# Patient Record
Sex: MALE | Race: WHITE | NOT HISPANIC OR LATINO | Employment: OTHER | URBAN - METROPOLITAN AREA
[De-identification: names, ages, dates, MRNs, and addresses within clinical notes are randomized per-mention and may not be internally consistent; named-entity substitution may affect disease eponyms.]

---

## 2018-11-20 ENCOUNTER — HISTORICAL (OUTPATIENT)
Dept: RADIOLOGY | Facility: HOSPITAL | Age: 78
End: 2018-11-20

## 2018-11-20 LAB — POC CREATININE: 1.1 MG/DL (ref 0.6–1.3)

## 2020-08-25 ENCOUNTER — HISTORICAL (OUTPATIENT)
Dept: RADIOLOGY | Facility: HOSPITAL | Age: 80
End: 2020-08-25

## 2020-09-02 ENCOUNTER — HISTORICAL (OUTPATIENT)
Dept: RADIOLOGY | Facility: HOSPITAL | Age: 80
End: 2020-09-02

## 2020-09-02 LAB
ABS NEUT (OLG): 3.1 X10(3)/MCL (ref 1.5–6.9)
APTT PPP: 30.1 SEC (ref 23.4–34.9)
BUN SERPL-MCNC: 22 MG/DL (ref 8.4–25.7)
CALCIUM SERPL-MCNC: 9.9 MG/DL (ref 8.8–10)
CHLORIDE SERPL-SCNC: 103 MMOL/L (ref 98–107)
CO2 SERPL-SCNC: 26 MMOL/L (ref 23–31)
CREAT SERPL-MCNC: 1.28 MG/DL (ref 0.73–1.18)
CREAT/UREA NIT SERPL: 17
ERYTHROCYTE [DISTWIDTH] IN BLOOD BY AUTOMATED COUNT: 13.8 % (ref 11.5–17)
GLUCOSE SERPL-MCNC: 99 MG/DL (ref 82–115)
HCT VFR BLD AUTO: 38.6 % (ref 42–52)
HGB BLD-MCNC: 13.1 GM/DL (ref 14–18)
INR PPP: 1.1 (ref 2–3)
MCH RBC QN AUTO: 32 PG (ref 27–34)
MCHC RBC AUTO-ENTMCNC: 34 GM/DL (ref 31–36)
MCV RBC AUTO: 93 FL (ref 80–99)
PLATELET # BLD AUTO: 134 X10(3)/MCL (ref 140–400)
PMV BLD AUTO: 10.7 FL (ref 6.8–10)
POTASSIUM SERPL-SCNC: 5 MMOL/L (ref 3.5–5.1)
PROTHROMBIN TIME: 13.6 SEC (ref 11.7–14.5)
PSA SERPL-MCNC: 105.66 NG/ML
RBC # BLD AUTO: 4.15 X10(6)/MCL (ref 4.7–6.1)
SODIUM SERPL-SCNC: 139 MMOL/L (ref 136–145)
WBC # SPEC AUTO: 5.5 X10(3)/MCL (ref 4.5–11.5)

## 2020-09-08 ENCOUNTER — HISTORICAL (OUTPATIENT)
Dept: ANESTHESIOLOGY | Facility: HOSPITAL | Age: 80
End: 2020-09-08

## 2020-09-25 ENCOUNTER — HISTORICAL (OUTPATIENT)
Dept: RADIOLOGY | Facility: HOSPITAL | Age: 80
End: 2020-09-25

## 2020-10-02 ENCOUNTER — HISTORICAL (OUTPATIENT)
Dept: RADIOLOGY | Facility: HOSPITAL | Age: 80
End: 2020-10-02

## 2020-10-29 LAB
ABS NEUT (OLG): 3.4 X10(3)/MCL (ref 1.5–6.9)
APTT PPP: 29.3 SEC (ref 23.4–34.9)
BUN SERPL-MCNC: 21 MG/DL (ref 8.4–25.7)
CALCIUM SERPL-MCNC: 9.5 MG/DL (ref 8.8–10)
CHLORIDE SERPL-SCNC: 103 MMOL/L (ref 98–107)
CO2 SERPL-SCNC: 27 MMOL/L (ref 23–31)
CREAT SERPL-MCNC: 1.28 MG/DL (ref 0.73–1.18)
CREAT/UREA NIT SERPL: 16
ERYTHROCYTE [DISTWIDTH] IN BLOOD BY AUTOMATED COUNT: 13.2 % (ref 11.5–17)
GLUCOSE SERPL-MCNC: 121 MG/DL (ref 82–115)
HCT VFR BLD AUTO: 36.7 % (ref 42–52)
HGB BLD-MCNC: 12.5 GM/DL (ref 14–18)
INR PPP: 1.1 (ref 2–3)
MCH RBC QN AUTO: 32 PG (ref 27–34)
MCHC RBC AUTO-ENTMCNC: 34 GM/DL (ref 31–36)
MCV RBC AUTO: 93 FL (ref 80–99)
PLATELET # BLD AUTO: 134 X10(3)/MCL (ref 140–400)
PMV BLD AUTO: 10.5 FL (ref 6.8–10)
POTASSIUM SERPL-SCNC: 4.1 MMOL/L (ref 3.5–5.1)
PROTHROMBIN TIME: 13.6 SEC (ref 11.7–14.5)
RBC # BLD AUTO: 3.95 X10(6)/MCL (ref 4.7–6.1)
SODIUM SERPL-SCNC: 138 MMOL/L (ref 136–145)
WBC # SPEC AUTO: 5.2 X10(3)/MCL (ref 4.5–11.5)

## 2020-11-03 ENCOUNTER — HOSPITAL ENCOUNTER (OUTPATIENT)
Dept: MEDSURG UNIT | Facility: HOSPITAL | Age: 80
End: 2020-11-05
Attending: UROLOGY | Admitting: UROLOGY

## 2020-11-03 LAB
BUN SERPL-MCNC: 23 MG/DL (ref 8.4–25.7)
CALCIUM SERPL-MCNC: 8.7 MG/DL (ref 8.8–10)
CHLORIDE SERPL-SCNC: 103 MMOL/L (ref 98–107)
CO2 SERPL-SCNC: 27 MMOL/L (ref 23–31)
CREAT SERPL-MCNC: 1.17 MG/DL (ref 0.73–1.18)
CREAT/UREA NIT SERPL: 20
EST CREAT CLEARANCE SER (OHS): 48.89 ML/MIN
GLUCOSE SERPL-MCNC: 105 MG/DL (ref 82–115)
HCT VFR BLD AUTO: 32.7 % (ref 42–52)
HGB BLD-MCNC: 10.9 GM/DL (ref 14–18)
POTASSIUM SERPL-SCNC: 5.3 MMOL/L (ref 3.5–5.1)
SODIUM SERPL-SCNC: 137 MMOL/L (ref 136–145)

## 2021-02-17 ENCOUNTER — HISTORICAL (OUTPATIENT)
Dept: RADIOLOGY | Facility: HOSPITAL | Age: 81
End: 2021-02-17

## 2021-02-17 LAB
BUN SERPL-MCNC: 29 MG/DL (ref 8.4–25.7)
CALCIUM SERPL-MCNC: 9.7 MG/DL (ref 8.8–10)
CHLORIDE SERPL-SCNC: 99 MMOL/L (ref 98–107)
CO2 SERPL-SCNC: 29 MMOL/L (ref 23–31)
CREAT SERPL-MCNC: 1.19 MG/DL (ref 0.73–1.18)
CREAT/UREA NIT SERPL: 24
GLUCOSE SERPL-MCNC: 87 MG/DL (ref 82–115)
POTASSIUM SERPL-SCNC: 4.8 MMOL/L (ref 3.5–5.1)
PSA SERPL-MCNC: <0.1 NG/ML
SODIUM SERPL-SCNC: 137 MMOL/L (ref 136–145)

## 2021-03-02 ENCOUNTER — HISTORICAL (OUTPATIENT)
Dept: WOUND CARE | Facility: HOSPITAL | Age: 81
End: 2021-03-02

## 2021-12-09 ENCOUNTER — HISTORICAL (OUTPATIENT)
Dept: RADIOLOGY | Facility: HOSPITAL | Age: 81
End: 2021-12-09

## 2022-01-24 ENCOUNTER — HISTORICAL (OUTPATIENT)
Dept: RADIOLOGY | Facility: HOSPITAL | Age: 82
End: 2022-01-24

## 2022-04-21 ENCOUNTER — HISTORICAL (OUTPATIENT)
Dept: ADMINISTRATIVE | Facility: HOSPITAL | Age: 82
End: 2022-04-21
Payer: MEDICARE

## 2022-04-30 NOTE — OP NOTE
PREOPERATIVE DIAGNOSES:    1. Prostate nodule.  2. Elevated prostate-specific antigen.  3. Right hydronephrosis.  4. Incomplete emptying of the bladder.    POSTOPERATIVE DIAGNOSES:    1. Prostate nodule.  2. Elevated prostate-specific antigen.  3. Right hydronephrosis.  4. Incomplete emptying of the bladder.   5. Urethral stricture.    ANESTHESIA:  IV sedation and local.    PROCEDURE:  Cystoscopy with dilation of bulbar urethral stricture, right retrograde pyelogram, transrectal biopsy of the prostate.    DESCRIPTION OF PROCEDURE:  After operative consent, patient brought to the operating room and placed on the table in supine position.  IV sedation induced.  Patient converted to dorsal lithotomy position.  Genital area prepped and draped in the usual sterile fashion.  After adequate local anesthesia with 2% plain lidocaine jelly intraurethrally, the 22-Zambian cystoscope was advanced per urethra in the bladder __________ lesions.  He had a bulbar urethral stricture that was dilated with the scope.  The prostate was 3 fingerbreadths with trilobar obstruction.  Bladder demonstrated diffuse 3+ trabeculation.  Visualization was difficult secondary to chronic oozing of blood.  Never identified the left ureteral orifice.  Right ureteral orifice was able to be identified with difficulty.  We performed a right retrograde pyelogram.  We performed __________ from the lower portion of the bony pelvis all the way up.  He had severe J hooking of his distal ureter.  I was never able to get any guidewire, Glidewire or anything else past this point.  We tried the ureteroscope.  We were not able to visualize secondary to the bleeding.  At this time, the procedure was terminated.  The bladder was drained.  Transrectal biopsy of the prostate was obtained.  We obtained 2 cores from the right lobe and 3 cores from the left lobe.  The left lobe was where the firm area was.  The patient returned to his room.  He will be placed  on Cipro 500 mg 1 p.o. b.i.d. for 3 days.  We will check bladder scan to see if he is able to urinate.  I will follow him up in the office in 1 week with the results of his biopsy.     His preoperative lab work revealed electrolytes to be within normal limits.  His creatinine was 1.28.  PSA was elevated at 105.66.  PT was 13.6 with an INR of 1.1.  His hematocrit was 38.6.  He had discontinued his Plavix since 09/01.  His ultrasound of the abdomen revealed a right hydronephrosis and what appeared to be a small cyst in the upper pole of the left kidney.    PLAN:  Follow up in the office in 1 week with the results of his biopsy and determination of further treatment options.        REED/JERE   DD: 09/08/2020 0908   DT: 09/08/2020 0940  Job # [id number]    cc: Dr. Ye

## 2022-04-30 NOTE — DISCHARGE SUMMARY
PRIMARY DIAGNOSIS:  Prostate cancer, incomplete emptying of bladder.    OPERATIVE PROCEDURE:  Transurethral resection of prostate on 11/03.    HISTORY AND PHYSICAL:  See chart.    LABORATORY DATA:  Postop laboratory data revealed electrolytes within normal limits.  His potassium was slightly elevated at 5.3.  His postop hematocrit was 32.7.  Pathology report returned Jyoti 9 prostate cancer.    HOSPITAL COURSE:  The patient was admitted to the hospital on 11/03.  He underwent an uneventful transurethral resection of his prostate.  He has done well postoperatively.  His Marte catheter was removed on his 1st postoperative day.  He was passing initially some clots and had red urine.  His urine is now clear.  He is voiding with a good stream.  His bladder scan is 0.  His pathology report returned Jyoti 9 prostate cancer, which was confirmatory with his preoperative biopsy.  He has done well.  He is feeling well.  He is ambulating.  He is tolerating his diet, and he is ready for discharge.  He is being discharged to home on Cipro 500 mg 1 p.o. b.i.d. for 5 days.  He is instructed no driving, lifting, or straining.  He is to follow up with me in the office in 2 weeks.        REED/JERE   DD: 11/05/2020 1001   DT: 11/05/2020 1118  Job # [id number]    cc: Dr. Ye

## 2022-04-30 NOTE — OP NOTE
PREOPERATIVE DIAGNOSIS:  Prostate cancer, bladder outlet obstruction, elevated residual urine.    POSTOPERATIVE DIAGNOSIS:  Prostate cancer, bladder outlet obstruction, elevated residual urine.    ANESTHESIA:  Spinal.    PROCEDURE:  Transurethral resection of the prostate.    DESCRIPTION OF PROCEDURE:  After operative consent, patient was brought to the operating room, placed on the table in supine position.  Spinal anesthesia induced.  Patient converted to dorsal lithotomy position.  Genital area prepped and draped in usual sterile fashion.  The 26-Syrian continuous flow resectoscope sheath was inserted per urethra into the bladder with the aid of the visual obturator.       The bladder was visualized.  There were no mucosal lesions identified.  Ureteral orifices could not be identified, secondary to the median lobe.  At this time, the resectoscope was pulled into the proximal portion of the prostate, and the median lobe was resected.  Hemostasis was obtained by means of electrocoagulation.  At this time, the ureteral orifices could be seen.  Left ureteral orifice normal.  Right ureteral orifice somewhat heaped up and more difficult to visualize.  The resectoscope was then pulled into the distal portion of the verumontanum, and the prostate was resected circumferentially.  No major venous sinuses or capsular perforations encountered.  The verumontanum was left intact.  After completion of resection, the bladder was evacuated of residual prostatic chips using Ellik evacuator.  The bladder was then visualized.  There was no injury to the bladder.  Ureteral orifices were identified and were intact.       Final hemostasis was obtained by means of electrocoagulation, and resectoscope was removed.  A 22-Syrian three-way, 30 cc Marte catheter was inserted per urethra into the bladder.  It was     irrigated until clear, placed on light traction, continuous bladder irrigation with sterile water, and the patient  was transported to the recovery room in stable condition.        REED/JERE   DD: 11/03/2020 0857   DT: 11/03/2020 1017  Job # [id number]    cc: Dr Ye

## 2022-05-09 ENCOUNTER — HOSPITAL ENCOUNTER (OUTPATIENT)
Dept: RADIOLOGY | Facility: HOSPITAL | Age: 82
Discharge: HOME | End: 2022-05-09
Attending: UROLOGY
Payer: MEDICARE

## 2022-05-09 DIAGNOSIS — S32.020A CLOSED COMPRESSION FRACTURE OF L2 LUMBAR VERTEBRA, INITIAL ENCOUNTER: ICD-10-CM

## 2022-05-09 PROCEDURE — 71045 X-RAY EXAM CHEST 1 VIEW: CPT | Mod: TC

## 2022-07-05 ENCOUNTER — HOSPITAL ENCOUNTER (OUTPATIENT)
Dept: RADIOLOGY | Facility: HOSPITAL | Age: 82
Discharge: HOME | End: 2022-07-05
Attending: NURSE PRACTITIONER
Payer: MEDICARE

## 2022-07-05 DIAGNOSIS — S32.020A: ICD-10-CM

## 2022-07-05 PROCEDURE — 72100 X-RAY EXAM L-S SPINE 2/3 VWS: CPT | Mod: TC

## 2022-09-21 DIAGNOSIS — N13.30 HYDRONEPHROSIS: Primary | ICD-10-CM

## 2022-10-03 ENCOUNTER — HOSPITAL ENCOUNTER (OUTPATIENT)
Dept: RADIOLOGY | Facility: HOSPITAL | Age: 82
Discharge: HOME | End: 2022-10-03
Attending: UROLOGY
Payer: MEDICARE

## 2022-10-03 DIAGNOSIS — N13.30 HYDRONEPHROSIS: ICD-10-CM

## 2022-10-03 PROCEDURE — 76770 US EXAM ABDO BACK WALL COMP: CPT | Mod: TC

## 2023-02-20 ENCOUNTER — HOSPITAL ENCOUNTER (EMERGENCY)
Facility: HOSPITAL | Age: 83
Discharge: HOME | End: 2023-02-20
Attending: STUDENT IN AN ORGANIZED HEALTH CARE EDUCATION/TRAINING PROGRAM
Payer: MEDICARE

## 2023-02-20 VITALS
BODY MASS INDEX: 17.71 KG/M2 | WEIGHT: 138 LBS | HEIGHT: 74 IN | DIASTOLIC BLOOD PRESSURE: 73 MMHG | RESPIRATION RATE: 21 BRPM | HEART RATE: 65 BPM | TEMPERATURE: 99 F | OXYGEN SATURATION: 100 % | SYSTOLIC BLOOD PRESSURE: 136 MMHG

## 2023-02-20 DIAGNOSIS — R07.9 CHEST PAIN: ICD-10-CM

## 2023-02-20 DIAGNOSIS — K59.00 CONSTIPATION, UNSPECIFIED CONSTIPATION TYPE: ICD-10-CM

## 2023-02-20 DIAGNOSIS — R10.84 GENERALIZED ABDOMINAL PAIN: Primary | ICD-10-CM

## 2023-02-20 LAB
ALBUMIN SERPL-MCNC: 4.1 G/DL (ref 3.4–4.8)
ALBUMIN/GLOB SERPL: 0.9 RATIO (ref 1.1–2)
ALP SERPL-CCNC: 99 UNIT/L (ref 40–150)
ALT SERPL-CCNC: 12 UNIT/L (ref 0–55)
AST SERPL-CCNC: 20 UNIT/L (ref 5–34)
BASOPHILS # BLD AUTO: 0.03 X10(3)/MCL (ref 0–0.2)
BASOPHILS NFR BLD AUTO: 0.5 %
BILIRUBIN DIRECT+TOT PNL SERPL-MCNC: 0.3 MG/DL
BUN SERPL-MCNC: 24 MG/DL (ref 8.4–25.7)
CALCIUM SERPL-MCNC: 10.3 MG/DL (ref 8.8–10)
CHLORIDE SERPL-SCNC: 105 MMOL/L (ref 98–107)
CO2 SERPL-SCNC: 25 MMOL/L (ref 23–31)
CREAT SERPL-MCNC: 0.95 MG/DL (ref 0.73–1.18)
EOSINOPHIL # BLD AUTO: 0.11 X10(3)/MCL (ref 0–0.9)
EOSINOPHIL NFR BLD AUTO: 1.7 %
ERYTHROCYTE [DISTWIDTH] IN BLOOD BY AUTOMATED COUNT: 14 % (ref 11.5–17)
FLUAV AG UPPER RESP QL IA.RAPID: NOT DETECTED
FLUBV AG UPPER RESP QL IA.RAPID: NOT DETECTED
GFR SERPLBLD CREATININE-BSD FMLA CKD-EPI: >60 MLS/MIN/1.73/M2
GLOBULIN SER-MCNC: 4.4 GM/DL (ref 2.4–3.5)
GLUCOSE SERPL-MCNC: 107 MG/DL (ref 82–115)
HCT VFR BLD AUTO: 34 % (ref 42–52)
HGB BLD-MCNC: 11.3 G/DL (ref 14–18)
IMM GRANULOCYTES # BLD AUTO: 0.02 X10(3)/MCL (ref 0–0.04)
IMM GRANULOCYTES NFR BLD AUTO: 0.3 %
INR BLD: 1.01 (ref 0–1.3)
LIPASE SERPL-CCNC: 38 U/L
LYMPHOCYTES # BLD AUTO: 2.71 X10(3)/MCL (ref 0.6–4.6)
LYMPHOCYTES NFR BLD AUTO: 41.7 %
MAGNESIUM SERPL-MCNC: 2.1 MG/DL (ref 1.6–2.6)
MCH RBC QN AUTO: 33.6 PG
MCHC RBC AUTO-ENTMCNC: 33.2 G/DL (ref 33–36)
MCV RBC AUTO: 101.2 FL (ref 80–94)
MONOCYTES # BLD AUTO: 0.45 X10(3)/MCL (ref 0.1–1.3)
MONOCYTES NFR BLD AUTO: 6.9 %
NEUTROPHILS # BLD AUTO: 3.18 X10(3)/MCL (ref 2.1–9.2)
NEUTROPHILS NFR BLD AUTO: 48.9 %
PLATELET # BLD AUTO: 230 X10(3)/MCL (ref 130–400)
PMV BLD AUTO: 10 FL (ref 7.4–10.4)
POTASSIUM SERPL-SCNC: 4.7 MMOL/L (ref 3.5–5.1)
PROT SERPL-MCNC: 8.5 GM/DL (ref 5.8–7.6)
PROTHROMBIN TIME: 13.6 SECONDS (ref 12.5–14.5)
RBC # BLD AUTO: 3.36 X10(6)/MCL (ref 4.7–6.1)
SARS-COV-2 RNA RESP QL NAA+PROBE: NOT DETECTED
SODIUM SERPL-SCNC: 141 MMOL/L (ref 136–145)
TROPONIN I SERPL-MCNC: <0.01 NG/ML (ref 0–0.04)
WBC # SPEC AUTO: 6.5 X10(3)/MCL (ref 4.5–11.5)

## 2023-02-20 PROCEDURE — 93010 EKG 12-LEAD: ICD-10-PCS | Mod: ,,, | Performed by: INTERNAL MEDICINE

## 2023-02-20 PROCEDURE — 25000003 PHARM REV CODE 250: Performed by: INTERNAL MEDICINE

## 2023-02-20 PROCEDURE — 93010 ELECTROCARDIOGRAM REPORT: CPT | Mod: ,,, | Performed by: INTERNAL MEDICINE

## 2023-02-20 PROCEDURE — 93005 ELECTROCARDIOGRAM TRACING: CPT

## 2023-02-20 PROCEDURE — 85610 PROTHROMBIN TIME: CPT | Performed by: INTERNAL MEDICINE

## 2023-02-20 PROCEDURE — 84484 ASSAY OF TROPONIN QUANT: CPT | Performed by: STUDENT IN AN ORGANIZED HEALTH CARE EDUCATION/TRAINING PROGRAM

## 2023-02-20 PROCEDURE — 85025 COMPLETE CBC W/AUTO DIFF WBC: CPT | Performed by: STUDENT IN AN ORGANIZED HEALTH CARE EDUCATION/TRAINING PROGRAM

## 2023-02-20 PROCEDURE — 99285 EMERGENCY DEPT VISIT HI MDM: CPT | Mod: 25

## 2023-02-20 PROCEDURE — 0240U COVID/FLU A&B PCR: CPT | Performed by: STUDENT IN AN ORGANIZED HEALTH CARE EDUCATION/TRAINING PROGRAM

## 2023-02-20 PROCEDURE — 83735 ASSAY OF MAGNESIUM: CPT | Performed by: STUDENT IN AN ORGANIZED HEALTH CARE EDUCATION/TRAINING PROGRAM

## 2023-02-20 PROCEDURE — 80053 COMPREHEN METABOLIC PANEL: CPT | Performed by: STUDENT IN AN ORGANIZED HEALTH CARE EDUCATION/TRAINING PROGRAM

## 2023-02-20 PROCEDURE — 83690 ASSAY OF LIPASE: CPT | Performed by: INTERNAL MEDICINE

## 2023-02-20 RX ORDER — LACTULOSE 10 G/15ML
20 SOLUTION ORAL
Status: COMPLETED | OUTPATIENT
Start: 2023-02-20 | End: 2023-02-20

## 2023-02-20 RX ORDER — ONDANSETRON 4 MG/1
4 TABLET, ORALLY DISINTEGRATING ORAL EVERY 6 HOURS PRN
Qty: 15 TABLET | Refills: 0 | Status: SHIPPED | OUTPATIENT
Start: 2023-02-20

## 2023-02-20 RX ORDER — LACTULOSE 10 G/15ML
20 SOLUTION ORAL 3 TIMES DAILY PRN
Qty: 473 ML | Refills: 1 | Status: SHIPPED | OUTPATIENT
Start: 2023-02-20

## 2023-02-20 RX ADMIN — LACTULOSE 20 G: 20 SOLUTION ORAL at 07:02

## 2023-02-21 NOTE — ED PROVIDER NOTES
Source of History:  Patient, no limitations    Chief complaint:  Chest Pain (C/o pain to right side chest, right ribs, and cough started last night. Hx rib, sacral, and pelvic CA) and Cough      HPI:  Williams Palacios is a 82 y.o. male presenting with Chest Pain (C/o pain to right side chest, right ribs, and cough started last night. Hx rib, sacral, and pelvic CA) and Cough        Patient presents for evaluation of abdominal pain. Onset of symptoms was abrupt starting 1 day ago with gradually worsening course since that time. The pain is located epigastric with radiation to RUQ. The pain is rated as moderate. The pain is made worse by pressure and is relieved by nothing. The patient also complains of none. The patient denies anorexia, constipation, diarrhea, dysuria, vomiting, and fever.       Review of Systems   Constitutional symptoms:  Negative except as documented in HPI.   Skin symptoms:  Negative except as documented in HPI.   HEENT symptoms:  Negative except as documented in HPI.   Respiratory symptoms:  Negative except as documented in HPI.   Cardiovascular symptoms:  Negative except as documented in HPI.   Gastrointestinal symptoms:  Negative except as documented in HPI.    Genitourinary symptoms:  Negative except as documented in HPI.   Musculoskeletal symptoms:  Negative except as documented in HPI.   Neurologic symptoms:  Negative except as documented in HPI.   Psychiatric symptoms:  Negative except as documented in HPI.   Allergy/immunologic symptoms:  Negative except as documented in HPI.             Additional review of systems information: All other systems reviewed and otherwise negative.      Review of patient's allergies indicates:   Allergen Reactions    Morphine        PMH:  As per HPI and below:    No past medical history on file.     No family history on file.    No past surgical history on file.    Social History     Tobacco Use    Smoking status: Every Day     Types: Cigarettes     "Smokeless tobacco: Never       There is no problem list on file for this patient.       Physical Exam:    /66   Pulse (!) 58   Temp 98.5 °F (36.9 °C) (Oral)   Resp 20   Ht 6' 2" (1.88 m)   Wt 62.6 kg (138 lb)   SpO2 99%   BMI 17.72 kg/m²     Nursing note and vital signs reviewed.    General:  Alert, elderly male, frail   Skin: Normal for Ethnic Origin, No cyanosis  HEENT: Normocephalic and atraumatic, Vision unchanged, Pupils symmetric, No icterus , Nasal mucosa is pink and moist  Cardiovascular:  Regular rate and rhythm  Chest Wall: No deformity, equal chest rise  Respiratory:  Lungs are clear to auscultation, respirations are non-labored.    Musculoskeletal:  No deformity, Normal perfusion to all extremities  Gastrointestinal:  Soft, Non distended, mild epigastric and RUQ tenderness  Neurological:  Alert and oriented, normal motor observed, normal speech observed.    Psychiatric:  Cooperative, appropriate mood & affect.        Labs that have been ordered have been independently reviewed and interpreted by myself.     Old Chart Reviewed.      Initial Impression/ Differential Dx:  GERD, intestinal spasm, gastroenteritis, gastritis, ulcer, cholecystitis, cholelithiasis, gallstones, pancreatitis, ileus, small bowel obstruction, appendicitis, diverticulitis, colitis, constipation, intestinal gas pain.      MDM:      Reviewed Nurses Note.    Reviewed Pertinent old records.    Orders Placed This Encounter    X-Ray Chest 1 View    CT Abdomen Pelvis  Without Contrast    CBC auto differential    Comprehensive metabolic panel    COVID/FLU A&B PCR    Magnesium    Troponin I    CBC with Differential    Protime-INR    Lipase    EKG 12-lead    lactulose 20 gram/30 mL solution Soln 20 g    ondansetron (ZOFRAN-ODT) 4 MG TbDL    lactulose (CHRONULAC) 20 gram/30 mL Soln                    Labs Reviewed   COMPREHENSIVE METABOLIC PANEL - Abnormal; Notable for the following components:       Result Value    Calcium Level " Total 10.3 (*)     Protein Total 8.5 (*)     Globulin 4.4 (*)     Albumin/Globulin Ratio 0.9 (*)     All other components within normal limits   CBC WITH DIFFERENTIAL - Abnormal; Notable for the following components:    RBC 3.36 (*)     Hgb 11.3 (*)     Hct 34.0 (*)     .2 (*)     All other components within normal limits   COVID/FLU A&B PCR - Normal    Narrative:     The Xpert Xpress SARS-CoV-2/FLU/RSV plus is a rapid, multiplexed real-time PCR test intended for the simultaneous qualitative detection and differentiation of SARS-CoV-2, Influenza A, Influenza B, and respiratory syncytial virus (RSV) viral RNA in either nasopharyngeal swab or nasal swab specimens.         MAGNESIUM - Normal   TROPONIN I - Normal   PROTIME-INR - Normal   LIPASE - Normal   CBC W/ AUTO DIFFERENTIAL    Narrative:     The following orders were created for panel order CBC auto differential.  Procedure                               Abnormality         Status                     ---------                               -----------         ------                     CBC with Differential[[id number]]        Abnormal            Final result                 Please view results for these tests on the individual orders.          CT Abdomen Pelvis  Without Contrast   Final Result      1. Large amount of stool throughout the colon consistent with constipation.   2. Left-sided inguinal hernia.         Electronically signed by: Jean-Claude Mendoza MD   Date:    02/20/2023   Time:    18:57      X-Ray Chest 1 View   Final Result      No acute cardiopulmonary abnormality.         Electronically signed by: Jean-Claude Mendoza MD   Date:    02/20/2023   Time:    18:06           Admission on 02/20/2023   Component Date Value Ref Range Status    Sodium Level 02/20/2023 141  136 - 145 mmol/L Final    Potassium Level 02/20/2023 4.7  3.5 - 5.1 mmol/L Final    Chloride 02/20/2023 105  98 - 107 mmol/L Final    Carbon Dioxide 02/20/2023 25  23 - 31 mmol/L Final    Glucose Level  02/20/2023 107  82 - 115 mg/dL Final    Blood Urea Nitrogen 02/20/2023 24.0  8.4 - 25.7 mg/dL Final    Creatinine 02/20/2023 0.95  0.73 - 1.18 mg/dL Final    Calcium Level Total 02/20/2023 10.3 (H)  8.8 - 10.0 mg/dL Final    Protein Total 02/20/2023 8.5 (H)  5.8 - 7.6 gm/dL Final    Albumin Level 02/20/2023 4.1  3.4 - 4.8 g/dL Final    Globulin 02/20/2023 4.4 (H)  2.4 - 3.5 gm/dL Final    Albumin/Globulin Ratio 02/20/2023 0.9 (L)  1.1 - 2.0 ratio Final    Bilirubin Total 02/20/2023 0.3  <=1.5 mg/dL Final    Alkaline Phosphatase 02/20/2023 99  40 - 150 unit/L Final    Alanine Aminotransferase 02/20/2023 12  0 - 55 unit/L Final    Aspartate Aminotransferase 02/20/2023 20  5 - 34 unit/L Final    eGFR 02/20/2023 >60  mls/min/1.73/m2 Final    Influenza A PCR 02/20/2023 Not Detected  Not Detected Final    Influenza B PCR 02/20/2023 Not Detected  Not Detected Final    SARS-CoV-2 PCR 02/20/2023 Not Detected  Not Detected, Negative, Invalid Final    Magnesium Level 02/20/2023 2.10  1.60 - 2.60 mg/dL Final    Troponin-I 02/20/2023 <0.010  0.000 - 0.045 ng/mL Final    WBC 02/20/2023 6.5  4.5 - 11.5 x10(3)/mcL Final    RBC 02/20/2023 3.36 (L)  4.70 - 6.10 x10(6)/mcL Final    Hgb 02/20/2023 11.3 (L)  14.0 - 18.0 g/dL Final    Hct 02/20/2023 34.0 (L)  42.0 - 52.0 % Final    MCV 02/20/2023 101.2 (H)  80.0 - 94.0 fL Final    MCH 02/20/2023 33.6  pg Final    MCHC 02/20/2023 33.2  33.0 - 36.0 g/dL Final    RDW 02/20/2023 14.0  11.5 - 17.0 % Final    Platelet 02/20/2023 230  130 - 400 x10(3)/mcL Final    MPV 02/20/2023 10.0  7.4 - 10.4 fL Final    Neut % 02/20/2023 48.9  % Final    Lymph % 02/20/2023 41.7  % Final    Mono % 02/20/2023 6.9  % Final    Eos % 02/20/2023 1.7  % Final    Basophil % 02/20/2023 0.5  % Final    Lymph # 02/20/2023 2.71  0.6 - 4.6 x10(3)/mcL Final    Neut # 02/20/2023 3.18  2.1 - 9.2 x10(3)/mcL Final    Mono # 02/20/2023 0.45  0.1 - 1.3 x10(3)/mcL Final    Eos # 02/20/2023 0.11  0 - 0.9 x10(3)/mcL Final    Baso  # 02/20/2023 0.03  0 - 0.2 x10(3)/mcL Final    IG# 02/20/2023 0.02  0 - 0.04 x10(3)/mcL Final    IG% 02/20/2023 0.3  % Final    PT 02/20/2023 13.6  12.5 - 14.5 seconds Final    INR 02/20/2023 1.01  0.00 - 1.30 Final    Lipase Level 02/20/2023 38  <=60 U/L Final       Imaging Results              CT Abdomen Pelvis  Without Contrast (Final result)  Result time 02/20/23 18:57:53      Final result by Jean-Claude Mendoza MD (02/20/23 18:57:53)                   Impression:      1. Large amount of stool throughout the colon consistent with constipation.  2. Left-sided inguinal hernia.      Electronically signed by: Jean-Claude Mendoza MD  Date:    02/20/2023  Time:    18:57               Narrative:    EXAMINATION:  CT ABDOMEN PELVIS WITHOUT CONTRAST    CLINICAL HISTORY:  Abdominal pain, acute, nonlocalized;    TECHNIQUE:  Axial images of the abdomen and pelvis were obtained without IV contrast administration.  Coronal and sagittal reconstructions were provided.  Three dimensional and MIP images were obtained and evaluated.  Total DLP was 275 mGy-cm. Dose lowering technique and automated exposure control were utilized for this exam.    COMPARISON:  CT of the abdomen and pelvis 09/25/2020.    FINDINGS:  There are emphysematous changes in the bilateral lung bases.  The heart is not enlarged.    The liver is homogeneous in attenuation.  The gallbladder, spleen, pancreas, and adrenal glands are normal.  The right kidney is atrophic.  There are simple renal cysts bilaterally.  There is no hydronephrosis or nephrolithiasis.    The stomach and small bowel are decompressed.  There is no bowel obstruction.  There is a large amount of stool throughout the colon.  There is a left-sided inguinal hernia.  The urinary bladder is decompressed.  There is no pelvic or retroperitoneal adenopathy.  The aorta is nonaneurysmal.  There is no lytic or blastic osseous lesion.                                       X-Ray Chest 1 View (Final result)  Result time  02/20/23 18:06:25      Final result by Jean-Claude Mendoza MD (02/20/23 18:06:25)                   Impression:      No acute cardiopulmonary abnormality.      Electronically signed by: Jean-Claude Mendoza MD  Date:    02/20/2023  Time:    18:06               Narrative:    EXAMINATION:  Single view chest radiograph.    CLINICAL HISTORY:  Chest pain, unspecified    TECHNIQUE:  Single view of the chest.    COMPARISON:  Chest radiograph 05/09/2022.    FINDINGS:  The lungs are clear without consolidation or effusion.  There is no pneumothorax.  The cardiac silhouette is normal in size.  There is no acute osseous abnormality.                                        ECG Results              EKG 12-lead (Preliminary result)  Result time 02/20/23 17:58:51      Wet Read by Balaji Velazquez DO (02/20/23 17:58:51, [facility] - Emergency Dept, Emergency Medicine)    Independent ECG Interpretation:    Sinus rhythm with pvc at rate of 65. Normal intervals. Normal QRS. Nonspecific ST or T wave abnormalities. Overall impression: Abnormal                                                                      Diagnostic Impression:    1. Generalized abdominal pain    2. Chest pain    3. Constipation, unspecified constipation type         ED Disposition Condition    Discharge Stable             Follow-up Information       [facility] - Emergency Dept.    Specialty: Emergency Medicine  Why: If symptoms worsen  Contact information:  [address] [facility] [address] [phone]                            ED Prescriptions       Medication Sig Dispense Start Date End Date Auth. Provider    ondansetron (ZOFRAN-ODT) 4 MG TbDL Take 1 tablet (4 mg total) by mouth every 6 (six) hours as needed (nausea). 15 tablet 2/20/2023 -- Balaji Velazquez DO    lactulose (CHRONULAC) 20 gram/30 mL Soln Take 30 mLs (20 g total) by mouth 3 (three) times daily as needed (constipation). 473 mL 2/20/2023 -- Balaji Velazquez  DO          Follow-up Information       Follow up With Specialties Details Why Contact Info    [facility] - Emergency Dept Emergency Medicine  If symptoms worsen [id number]  [facility] [address]  [phone]             Balaji Velazquez,   02/20/23 1903

## 2023-05-31 ENCOUNTER — HOSPITAL ENCOUNTER (OUTPATIENT)
Dept: RADIOLOGY | Facility: HOSPITAL | Age: 83
Discharge: HOME | End: 2023-05-31
Attending: INTERNAL MEDICINE
Payer: MEDICARE

## 2023-05-31 DIAGNOSIS — R05.9 COUGH: ICD-10-CM

## 2023-05-31 DIAGNOSIS — M54.6 PAIN IN THORACIC SPINE: ICD-10-CM

## 2023-05-31 DIAGNOSIS — M25.559 HIP PAIN: ICD-10-CM

## 2023-05-31 DIAGNOSIS — M54.50 LOW BACK PAIN: ICD-10-CM

## 2023-05-31 PROCEDURE — 72070 X-RAY EXAM THORAC SPINE 2VWS: CPT | Mod: TC

## 2023-05-31 PROCEDURE — 73523 X-RAY EXAM HIPS BI 5/> VIEWS: CPT | Mod: TC

## 2023-05-31 PROCEDURE — 72100 X-RAY EXAM L-S SPINE 2/3 VWS: CPT | Mod: TC

## 2023-05-31 PROCEDURE — 71046 X-RAY EXAM CHEST 2 VIEWS: CPT | Mod: TC

## 2023-06-01 DIAGNOSIS — M54.50 LUMBAGO: ICD-10-CM

## 2023-06-01 DIAGNOSIS — S32.040A: Primary | ICD-10-CM

## 2023-06-05 ENCOUNTER — HOSPITAL ENCOUNTER (OUTPATIENT)
Dept: RADIOLOGY | Facility: HOSPITAL | Age: 83
Discharge: HOME | End: 2023-06-05
Attending: INTERNAL MEDICINE
Payer: MEDICARE

## 2023-06-05 DIAGNOSIS — S32.040A: ICD-10-CM

## 2023-06-05 DIAGNOSIS — M54.50 LUMBAGO: ICD-10-CM

## 2023-06-05 PROCEDURE — 72148 MRI LUMBAR SPINE W/O DYE: CPT | Mod: TC

## 2023-06-08 ENCOUNTER — HOSPITAL ENCOUNTER (OUTPATIENT)
Dept: RADIOLOGY | Facility: HOSPITAL | Age: 83
Discharge: HOME | End: 2023-06-08
Attending: INTERNAL MEDICINE
Payer: MEDICARE

## 2023-06-08 DIAGNOSIS — S32.040A: ICD-10-CM

## 2023-06-08 PROCEDURE — 71046 X-RAY EXAM CHEST 2 VIEWS: CPT | Mod: TC

## 2023-06-15 DIAGNOSIS — R31.29 OTHER MICROSCOPIC HEMATURIA: Primary | ICD-10-CM

## 2023-06-19 ENCOUNTER — HOSPITAL ENCOUNTER (OUTPATIENT)
Dept: RADIOLOGY | Facility: HOSPITAL | Age: 83
Discharge: HOME | End: 2023-06-19
Attending: UROLOGY
Payer: MEDICARE

## 2023-06-19 ENCOUNTER — ANESTHESIA EVENT (OUTPATIENT)
Dept: SURGERY | Facility: HOSPITAL | Age: 83
End: 2023-06-19
Payer: MEDICARE

## 2023-06-19 ENCOUNTER — CLINICAL SUPPORT (OUTPATIENT)
Dept: RESPIRATORY THERAPY | Facility: HOSPITAL | Age: 83
End: 2023-06-19
Attending: ANESTHESIOLOGY
Payer: MEDICARE

## 2023-06-19 DIAGNOSIS — R31.29 MICROSCOPIC HEMATURIA: Primary | ICD-10-CM

## 2023-06-19 DIAGNOSIS — R31.29 MICROSCOPIC HEMATURIA: ICD-10-CM

## 2023-06-19 DIAGNOSIS — Z01.818 PRE-OP EXAMINATION: ICD-10-CM

## 2023-06-19 PROCEDURE — 93005 ELECTROCARDIOGRAM TRACING: CPT

## 2023-06-19 PROCEDURE — 93010 EKG 12-LEAD: ICD-10-PCS | Mod: ,,, | Performed by: STUDENT IN AN ORGANIZED HEALTH CARE EDUCATION/TRAINING PROGRAM

## 2023-06-19 PROCEDURE — 76770 US EXAM ABDO BACK WALL COMP: CPT | Mod: TC

## 2023-06-19 PROCEDURE — 93010 ELECTROCARDIOGRAM REPORT: CPT | Mod: ,,, | Performed by: STUDENT IN AN ORGANIZED HEALTH CARE EDUCATION/TRAINING PROGRAM

## 2023-06-19 RX ORDER — METOPROLOL TARTRATE 50 MG/1
0.5 TABLET ORAL EVERY MORNING
COMMUNITY

## 2023-06-19 RX ORDER — MEGESTROL ACETATE 20 MG/1
1 TABLET ORAL EVERY MORNING
COMMUNITY

## 2023-06-19 RX ORDER — ENZALUTAMIDE 80 MG/1
2 TABLET ORAL NIGHTLY
COMMUNITY

## 2023-06-19 RX ORDER — ATORVASTATIN CALCIUM 40 MG/1
40 TABLET, FILM COATED ORAL NIGHTLY
COMMUNITY
Start: 2023-04-17

## 2023-06-19 RX ORDER — ASPIRIN 81 MG/1
1 TABLET ORAL EVERY MORNING
COMMUNITY

## 2023-06-19 RX ORDER — CLOPIDOGREL BISULFATE 75 MG/1
75 TABLET ORAL EVERY MORNING
COMMUNITY
Start: 2023-04-17

## 2023-06-19 NOTE — ANESTHESIA PREPROCEDURE EVALUATION
06/19/2023  Williams Palacios is a 82 y.o., male.      Pre-op Assessment    I have reviewed the Patient Summary Reports.     I have reviewed the Nursing Notes. I have reviewed the NPO Status.   I have reviewed the Medications.     Review of Systems  Anesthesia Hx:  Denies Family Hx of Anesthesia complications.   Denies Personal Hx of Anesthesia complications.   Social:  Smoker, No Alcohol Use    Hematology/Oncology:  Hematology Normal   Oncology Normal     EENT/Dental:EENT/Dental Normal   Cardiovascular:  Cardiovascular Normal  ECG has been reviewed.    Pulmonary:  Pulmonary Normal    Renal/:  Renal/ Normal     Hepatic/GI:  Hepatic/GI Normal    Musculoskeletal:  Musculoskeletal Normal    Neurological:  Neurology Normal    Endocrine:  Endocrine Normal    Dermatological:  Skin Normal    Psych:  Psychiatric Normal           Physical Exam  General: Cooperative, Alert and Oriented    Airway:  Mallampati: II   Mouth Opening: Normal  TM Distance: Normal  Tongue: Normal  Neck ROM: Normal ROM    Dental:  Intact        Anesthesia Plan  Type of Anesthesia, risks & benefits discussed:    Anesthesia Type: Gen Natural Airway  Intra-op Monitoring Plan: Standard ASA Monitors  Post Op Pain Control Plan: multimodal analgesia  Induction:  IV  Informed Consent: Informed consent signed with the Patient and all parties understand the risks and agree with anesthesia plan.  All questions answered. Patient consented to blood products? Yes  ASA Score: 3    Ready For Surgery From Anesthesia Perspective.     .

## 2023-06-19 NOTE — DISCHARGE INSTRUCTIONS
Nothing to eat or drink after midnight. Stop Plavix 6/18/23. Take Metoprolol AM of procedure with small sip of water.

## 2023-06-20 ENCOUNTER — ANESTHESIA (OUTPATIENT)
Dept: SURGERY | Facility: HOSPITAL | Age: 83
End: 2023-06-20
Payer: MEDICARE

## 2023-06-20 ENCOUNTER — HOSPITAL ENCOUNTER (OUTPATIENT)
Facility: HOSPITAL | Age: 83
Discharge: HOME | End: 2023-06-20
Attending: UROLOGY | Admitting: UROLOGY
Payer: MEDICARE

## 2023-06-20 DIAGNOSIS — R31.9 HEMATURIA: ICD-10-CM

## 2023-06-20 DIAGNOSIS — R31.29 MICROSCOPIC HEMATURIA: ICD-10-CM

## 2023-06-20 PROBLEM — N21.1 URETHRAL CALCULUS: Status: ACTIVE | Noted: 2023-06-20

## 2023-06-20 PROBLEM — C61 PROSTATE CANCER: Status: ACTIVE | Noted: 2023-06-20

## 2023-06-20 PROBLEM — N35.919 URETHRAL STRICTURE IN MALE: Status: ACTIVE | Noted: 2023-06-20

## 2023-06-20 PROCEDURE — 88300 SURGICAL PATH GROSS: CPT

## 2023-06-20 PROCEDURE — 37000008 HC ANESTHESIA 1ST 15 MINUTES: Performed by: UROLOGY

## 2023-06-20 PROCEDURE — 63600175 PHARM REV CODE 636 W HCPCS: Performed by: NURSE ANESTHETIST, CERTIFIED REGISTERED

## 2023-06-20 PROCEDURE — 27201423 OPTIME MED/SURG SUP & DEVICES STERILE SUPPLY: Performed by: UROLOGY

## 2023-06-20 PROCEDURE — 36000706: Performed by: UROLOGY

## 2023-06-20 PROCEDURE — 71000016 HC POSTOP RECOV ADDL HR: Performed by: UROLOGY

## 2023-06-20 PROCEDURE — 82365 CALCULUS SPECTROSCOPY: CPT | Performed by: UROLOGY

## 2023-06-20 PROCEDURE — 63600175 PHARM REV CODE 636 W HCPCS: Performed by: UROLOGY

## 2023-06-20 PROCEDURE — 25000003 PHARM REV CODE 250: Performed by: UROLOGY

## 2023-06-20 PROCEDURE — 25000003 PHARM REV CODE 250: Performed by: NURSE ANESTHETIST, CERTIFIED REGISTERED

## 2023-06-20 PROCEDURE — C1758 CATHETER, URETERAL: HCPCS | Performed by: UROLOGY

## 2023-06-20 PROCEDURE — 36000707: Performed by: UROLOGY

## 2023-06-20 PROCEDURE — 37000009 HC ANESTHESIA EA ADD 15 MINS: Performed by: UROLOGY

## 2023-06-20 PROCEDURE — D9220A PRA ANESTHESIA: ICD-10-PCS | Mod: ,,, | Performed by: NURSE ANESTHETIST, CERTIFIED REGISTERED

## 2023-06-20 PROCEDURE — 71000015 HC POSTOP RECOV 1ST HR: Performed by: UROLOGY

## 2023-06-20 PROCEDURE — D9220A PRA ANESTHESIA: Mod: ,,, | Performed by: NURSE ANESTHETIST, CERTIFIED REGISTERED

## 2023-06-20 PROCEDURE — C1769 GUIDE WIRE: HCPCS | Performed by: UROLOGY

## 2023-06-20 RX ORDER — LIDOCAINE HYDROCHLORIDE 20 MG/ML
JELLY TOPICAL
Status: DISCONTINUED | OUTPATIENT
Start: 2023-06-20 | End: 2023-06-20 | Stop reason: HOSPADM

## 2023-06-20 RX ORDER — PROPOFOL 10 MG/ML
VIAL (ML) INTRAVENOUS
Status: DISCONTINUED | OUTPATIENT
Start: 2023-06-20 | End: 2023-06-20

## 2023-06-20 RX ORDER — LIDOCAINE HYDROCHLORIDE 20 MG/ML
INJECTION, SOLUTION EPIDURAL; INFILTRATION; INTRACAUDAL; PERINEURAL
Status: DISCONTINUED | OUTPATIENT
Start: 2023-06-20 | End: 2023-06-20

## 2023-06-20 RX ORDER — SODIUM CHLORIDE, SODIUM LACTATE, POTASSIUM CHLORIDE, CALCIUM CHLORIDE 600; 310; 30; 20 MG/100ML; MG/100ML; MG/100ML; MG/100ML
INJECTION, SOLUTION INTRAVENOUS CONTINUOUS
Status: DISCONTINUED | OUTPATIENT
Start: 2023-06-20 | End: 2023-06-20 | Stop reason: HOSPADM

## 2023-06-20 RX ORDER — GENTAMICIN SULFATE 80 MG/100ML
80 INJECTION, SOLUTION INTRAVENOUS
Status: COMPLETED | OUTPATIENT
Start: 2023-06-20 | End: 2023-06-20

## 2023-06-20 RX ORDER — CIPROFLOXACIN 500 MG/1
500 TABLET ORAL EVERY 12 HOURS
Status: DISCONTINUED | OUTPATIENT
Start: 2023-06-20 | End: 2023-06-20 | Stop reason: HOSPADM

## 2023-06-20 RX ADMIN — PROPOFOL 50 MG: 10 INJECTION, EMULSION INTRAVENOUS at 08:06

## 2023-06-20 RX ADMIN — SODIUM CHLORIDE, POTASSIUM CHLORIDE, SODIUM LACTATE AND CALCIUM CHLORIDE: 600; 310; 30; 20 INJECTION, SOLUTION INTRAVENOUS at 07:06

## 2023-06-20 RX ADMIN — GENTAMICIN SULFATE 80 MG: 80 INJECTION, SOLUTION INTRAVENOUS at 08:06

## 2023-06-20 RX ADMIN — LIDOCAINE HYDROCHLORIDE 60 MG: 20 INJECTION, SOLUTION EPIDURAL; INFILTRATION; INTRACAUDAL; PERINEURAL at 08:06

## 2023-06-20 NOTE — ANESTHESIA POSTPROCEDURE EVALUATION
Anesthesia Post Evaluation    Patient: Williams Palacios    Procedure(s) Performed: Procedure(s) (LRB):  CYSTOSCOPY, WITH RETROGRADE PYELOGRAM (Bilateral)  URETHROTOMY, INTERNAL (N/A)  CYSTOSCOPY, WITH CALCULUS REMOVAL (N/A)    Final Anesthesia Type: general      Patient participation: Yes- Able to Participate  Level of consciousness: awake and alert and oriented  Post-procedure vital signs: reviewed and stable  Pain management: adequate  Airway patency: patent    PONV status at discharge: No PONV  Anesthetic complications: no      Cardiovascular status: stable  Respiratory status: unassisted, spontaneous ventilation and room air  Hydration status: euvolemic  Follow-up not needed.          Vitals Value Taken Time     06/20/23 0859     06/20/23 0859     06/20/23 0859     06/20/23 0859     06/20/23 0859         No case tracking events are documented in the log.      Pain/Jay Score: No data recorded

## 2023-06-20 NOTE — BRIEF OP NOTE
[facility] - Periop Services  Brief Operative Note    Surgery Date: 6/20/2023     Surgeon(s) and Role:     * Jonathan Houser MD - Primary    Assisting Surgeon: None    Pre-op Diagnosis:  Microscopic hematuria [R31.29]urethral stricture.prostate cancer    Post-op Diagnosis:  Post-Op Diagnosis Codes:     * Microscopic hematuria [R31.29]same plus urethral calculus    Procedure(s) (LRB):  CYSTOSCOPY, WITH RETROGRADE PYELOGRAM (Bilateral)  URETHROTOMY, INTERNAL (N/A)  CYSTOSCOPY, WITH CALCULUS REMOVAL (N/A)    AnesthesiaIV sedation and local    Operative Findings:     Estimated Blood Loss: 0 mL         Specimens: urethral calculus  Specimen (24h ago, onward)       Start     Ordered    06/20/23 0856  Specimen to Pathology  RELEASE UPON ORDERING        References:    Click here for ordering Quick Tip   Question:  Release to patient  Answer:  Immediate    06/20/23 0856                      Discharge Note    OUTCOME: Patient tolerated treatment/procedure well without complication and is now ready for discharge.    DISPOSITION: Home or Self Care    FINAL DIAGNOSIS:  <principal problem not specified>    FOLLOWUP: In clinic    DISCHARGE INSTRUCTIONS:  No discharge procedures on file.     Clinical Reference Documents Added to Patient Instructions         Document    CYSTOSCOPY DISCHARGE INSTRUCTIONS (ENGLISH)

## 2023-06-20 NOTE — OP NOTE
[facility]  Medical Surgical Unit  Operative Note      Date of Procedure: 6/20/23    Procedure: VIU, Bilateral retrograde pyelograms , removal of urethral calculus  Surgeon(s) and Role:     * Jonathan Houser MD - Primary    Assisting Surgeon:   Pre-Operative Diagnosis: prostate cancer. Hematuria,urethral stricture     Post-Operative Diagnosis: same  pus urethral calculus       Anesthesia: IV sedation and local  Operative Findings (including complications, if any): urethral stricture , urethral calculus    Description of Technical Procedures:  After operative consent patient brought to the operating room placed on the table in the supine position IV sedation induced patient converted to dorsal lithotomy position genital area prepped and draped in usual sterile fashion for adequate local anesthesia with 2% plain lidocaine jelly per urethra the 22 Peruvian cystoscope was introduced per urethra he demonstrated a stricture in the bulbar urethra the guidewire was guided through this and into the bladder cystoscope was then removed urethra tome was introduced under direct vision and the stricture was incised at 12:00 p.m. 4:00 a.m. and 8:00 a.m. positions to the scope could be manipulated time manipulation he had a stone line just proximal to the stricture this was manipulated back into the bladder bladder was visualized there was diffuse trabeculation no foreign bodies or mucosal lesions identified other than urethral calculus ureter at this time bilateral retrograde pyelogram performed using a 6 Peruvian universal catheter this revealed evidence of right hydronephrosis all the way down to the bladder left side was unremarkable ureteral  catheter was then removed and the stone was removed with the aid of a Nitinol stone basket cystoscope was removed 25 Peruvian cystoscope was introduced per urethra the stricture was further dilated the sternum bladder was drained cystoscope was removed for examination revealed a  "benign-feeling prostate and the patient was returned to his room in good condition his prostate was nonobstructing under cystoscopy  Laboratory data revealed a hematocrit of 33.2 and a white blood cell count of 6.15 creatinine was 0.78 ultrasound revealed bilateral renal cyst urine cultures pending PSA was 0.13  Plan will be follow up the office in 2 weeks he will be placed on some Cipro 500 mg 1 p.o. b.i.d. for 5 days    Significant Surgical Tasks Conducted by the Assistant(s), if Applicable:  No assistant    Estimated Blood Loss (EBL): * No values recorded between 5/3/2023 12:00 AM and 5/3/2023  2:57 PM * 0 cc           Implants: * No implants in log *    Specimens: urethral calcuus  Specimen (24h ago, onward)      None                    Condition: stable    Disposition: home    Attestation:     Discharge Note    OUTCOME:     DISPOSITION: "}    FINAL DIAGNOSIS:  FOLLOWUP:   DISCHARGE INSTRUCTIONS:     "

## 2023-06-23 VITALS
BODY MASS INDEX: 16.95 KG/M2 | TEMPERATURE: 98 F | WEIGHT: 132 LBS | HEART RATE: 54 BPM | OXYGEN SATURATION: 98 % | SYSTOLIC BLOOD PRESSURE: 149 MMHG | RESPIRATION RATE: 16 BRPM | DIASTOLIC BLOOD PRESSURE: 75 MMHG

## 2023-06-26 LAB — PSYCHE PATHOLOGY RESULT: NORMAL

## 2024-08-19 ENCOUNTER — HOSPITAL ENCOUNTER (EMERGENCY)
Facility: HOSPITAL | Age: 84
Discharge: ANOTHER HOSPITAL | End: 2024-08-19
Attending: STUDENT IN AN ORGANIZED HEALTH CARE EDUCATION/TRAINING PROGRAM
Payer: MEDICARE

## 2024-08-19 ENCOUNTER — HOSPITAL ENCOUNTER (INPATIENT)
Facility: HOSPITAL | Age: 84
LOS: 2 days | Discharge: HOME HEALTH | DRG: 066 | End: 2024-08-21
Attending: INTERNAL MEDICINE | Admitting: INTERNAL MEDICINE
Payer: MEDICARE

## 2024-08-19 VITALS
OXYGEN SATURATION: 95 % | HEART RATE: 62 BPM | RESPIRATION RATE: 20 BRPM | BODY MASS INDEX: 17.89 KG/M2 | WEIGHT: 135 LBS | TEMPERATURE: 97 F | DIASTOLIC BLOOD PRESSURE: 68 MMHG | HEIGHT: 73 IN | SYSTOLIC BLOOD PRESSURE: 156 MMHG

## 2024-08-19 DIAGNOSIS — I63.9 CEREBROVASCULAR ACCIDENT (CVA), UNSPECIFIED MECHANISM: Primary | ICD-10-CM

## 2024-08-19 DIAGNOSIS — R29.818 ACUTE FOCAL NEUROLOGICAL DEFICIT: ICD-10-CM

## 2024-08-19 DIAGNOSIS — I63.9 CVA (CEREBRAL VASCULAR ACCIDENT): Primary | ICD-10-CM

## 2024-08-19 DIAGNOSIS — R31.9 HEMATURIA, UNSPECIFIED TYPE: ICD-10-CM

## 2024-08-19 DIAGNOSIS — C61 PROSTATE CANCER: ICD-10-CM

## 2024-08-19 DIAGNOSIS — N40.0 BENIGN PROSTATIC HYPERPLASIA, UNSPECIFIED WHETHER LOWER URINARY TRACT SYMPTOMS PRESENT: ICD-10-CM

## 2024-08-19 DIAGNOSIS — I63.9 STROKE: ICD-10-CM

## 2024-08-19 LAB
ALBUMIN SERPL-MCNC: 3.7 G/DL (ref 3.4–4.8)
ALBUMIN/GLOB SERPL: 0.9 RATIO (ref 1.1–2)
ALP SERPL-CCNC: 98 UNIT/L (ref 40–150)
ALT SERPL-CCNC: 12 UNIT/L (ref 0–55)
ANION GAP SERPL CALC-SCNC: 8 MEQ/L
APTT PPP: 29.4 SECONDS (ref 24.6–37.2)
AST SERPL-CCNC: 18 UNIT/L (ref 5–34)
BASOPHILS # BLD AUTO: 0.03 X10(3)/MCL
BASOPHILS NFR BLD AUTO: 0.4 %
BILIRUB SERPL-MCNC: 0.4 MG/DL
BUN SERPL-MCNC: 21 MG/DL (ref 8.4–25.7)
CALCIUM SERPL-MCNC: 9.8 MG/DL (ref 8.8–10)
CHLORIDE SERPL-SCNC: 108 MMOL/L (ref 98–107)
CHOLEST SERPL-MCNC: 107 MG/DL
CHOLEST/HDLC SERPL: 3 {RATIO} (ref 0–5)
CO2 SERPL-SCNC: 23 MMOL/L (ref 23–31)
CREAT SERPL-MCNC: 0.99 MG/DL (ref 0.73–1.18)
CREAT/UREA NIT SERPL: 21
EOSINOPHIL # BLD AUTO: 0.11 X10(3)/MCL (ref 0–0.9)
EOSINOPHIL NFR BLD AUTO: 1.6 %
ERYTHROCYTE [DISTWIDTH] IN BLOOD BY AUTOMATED COUNT: 13.7 % (ref 11.5–17)
EST. AVERAGE GLUCOSE BLD GHB EST-MCNC: 99.7 MG/DL
GFR SERPLBLD CREATININE-BSD FMLA CKD-EPI: >60 ML/MIN/1.73/M2
GLOBULIN SER-MCNC: 4.3 GM/DL (ref 2.4–3.5)
GLUCOSE SERPL-MCNC: 100 MG/DL (ref 82–115)
HBA1C MFR BLD: 5.1 %
HCT VFR BLD AUTO: 32.5 % (ref 42–52)
HDLC SERPL-MCNC: 35 MG/DL (ref 35–60)
HGB BLD-MCNC: 11 G/DL (ref 14–18)
IMM GRANULOCYTES # BLD AUTO: 0.02 X10(3)/MCL (ref 0–0.04)
IMM GRANULOCYTES NFR BLD AUTO: 0.3 %
INR PPP: 1
LDLC SERPL CALC-MCNC: 54 MG/DL (ref 50–140)
LYMPHOCYTES # BLD AUTO: 3.04 X10(3)/MCL (ref 0.6–4.6)
LYMPHOCYTES NFR BLD AUTO: 43.7 %
MCH RBC QN AUTO: 34.1 PG (ref 27–31)
MCHC RBC AUTO-ENTMCNC: 33.8 G/DL (ref 33–36)
MCV RBC AUTO: 100.6 FL (ref 80–94)
MONOCYTES # BLD AUTO: 0.46 X10(3)/MCL (ref 0.1–1.3)
MONOCYTES NFR BLD AUTO: 6.6 %
NEUTROPHILS # BLD AUTO: 3.29 X10(3)/MCL (ref 2.1–9.2)
NEUTROPHILS NFR BLD AUTO: 47.4 %
OHS QRS DURATION: 90 MS
OHS QTC CALCULATION: 424 MS
PLATELET # BLD AUTO: 240 X10(3)/MCL (ref 130–400)
PMV BLD AUTO: 9.9 FL (ref 7.4–10.4)
POCT GLUCOSE: 94 MG/DL (ref 70–110)
POTASSIUM SERPL-SCNC: 4.2 MMOL/L (ref 3.5–5.1)
PROT SERPL-MCNC: 8 GM/DL (ref 5.8–7.6)
PROTHROMBIN TIME: 13.6 SECONDS (ref 12.5–14.5)
RBC # BLD AUTO: 3.23 X10(6)/MCL (ref 4.7–6.1)
SODIUM SERPL-SCNC: 139 MMOL/L (ref 136–145)
TRIGL SERPL-MCNC: 90 MG/DL (ref 34–140)
TSH SERPL-ACNC: 1.95 UIU/ML (ref 0.35–4.94)
VLDLC SERPL CALC-MCNC: 18 MG/DL
WBC # BLD AUTO: 6.95 X10(3)/MCL (ref 4.5–11.5)

## 2024-08-19 PROCEDURE — G0426 INPT/ED TELECONSULT50: HCPCS | Mod: 95,G0,, | Performed by: STUDENT IN AN ORGANIZED HEALTH CARE EDUCATION/TRAINING PROGRAM

## 2024-08-19 PROCEDURE — 85730 THROMBOPLASTIN TIME PARTIAL: CPT | Performed by: STUDENT IN AN ORGANIZED HEALTH CARE EDUCATION/TRAINING PROGRAM

## 2024-08-19 PROCEDURE — 84443 ASSAY THYROID STIM HORMONE: CPT | Performed by: STUDENT IN AN ORGANIZED HEALTH CARE EDUCATION/TRAINING PROGRAM

## 2024-08-19 PROCEDURE — 21400001 HC TELEMETRY ROOM

## 2024-08-19 PROCEDURE — 11000001 HC ACUTE MED/SURG PRIVATE ROOM

## 2024-08-19 PROCEDURE — 99291 CRITICAL CARE FIRST HOUR: CPT

## 2024-08-19 PROCEDURE — 80061 LIPID PANEL: CPT | Performed by: STUDENT IN AN ORGANIZED HEALTH CARE EDUCATION/TRAINING PROGRAM

## 2024-08-19 PROCEDURE — 80053 COMPREHEN METABOLIC PANEL: CPT | Performed by: STUDENT IN AN ORGANIZED HEALTH CARE EDUCATION/TRAINING PROGRAM

## 2024-08-19 PROCEDURE — 82962 GLUCOSE BLOOD TEST: CPT

## 2024-08-19 PROCEDURE — 93010 ELECTROCARDIOGRAM REPORT: CPT | Mod: ,,, | Performed by: STUDENT IN AN ORGANIZED HEALTH CARE EDUCATION/TRAINING PROGRAM

## 2024-08-19 PROCEDURE — 93005 ELECTROCARDIOGRAM TRACING: CPT

## 2024-08-19 PROCEDURE — 25500020 PHARM REV CODE 255: Performed by: STUDENT IN AN ORGANIZED HEALTH CARE EDUCATION/TRAINING PROGRAM

## 2024-08-19 PROCEDURE — 85610 PROTHROMBIN TIME: CPT | Performed by: STUDENT IN AN ORGANIZED HEALTH CARE EDUCATION/TRAINING PROGRAM

## 2024-08-19 PROCEDURE — 25000003 PHARM REV CODE 250: Performed by: STUDENT IN AN ORGANIZED HEALTH CARE EDUCATION/TRAINING PROGRAM

## 2024-08-19 PROCEDURE — 85025 COMPLETE CBC W/AUTO DIFF WBC: CPT | Performed by: STUDENT IN AN ORGANIZED HEALTH CARE EDUCATION/TRAINING PROGRAM

## 2024-08-19 PROCEDURE — 83036 HEMOGLOBIN GLYCOSYLATED A1C: CPT | Performed by: STUDENT IN AN ORGANIZED HEALTH CARE EDUCATION/TRAINING PROGRAM

## 2024-08-19 RX ORDER — EZETIMIBE 10 MG/1
10 TABLET ORAL DAILY
COMMUNITY
Start: 2024-07-29

## 2024-08-19 RX ORDER — PROCHLORPERAZINE EDISYLATE 5 MG/ML
5 INJECTION INTRAMUSCULAR; INTRAVENOUS EVERY 6 HOURS PRN
Status: DISCONTINUED | OUTPATIENT
Start: 2024-08-20 | End: 2024-08-21 | Stop reason: HOSPADM

## 2024-08-19 RX ORDER — ONDANSETRON HYDROCHLORIDE 2 MG/ML
4 INJECTION, SOLUTION INTRAVENOUS EVERY 4 HOURS PRN
Status: DISCONTINUED | OUTPATIENT
Start: 2024-08-20 | End: 2024-08-21 | Stop reason: HOSPADM

## 2024-08-19 RX ORDER — BISACODYL 10 MG/1
10 SUPPOSITORY RECTAL DAILY PRN
Status: DISCONTINUED | OUTPATIENT
Start: 2024-08-20 | End: 2024-08-21 | Stop reason: HOSPADM

## 2024-08-19 RX ORDER — NAPROXEN SODIUM 220 MG/1
324 TABLET, FILM COATED ORAL
Status: COMPLETED | OUTPATIENT
Start: 2024-08-19 | End: 2024-08-19

## 2024-08-19 RX ORDER — ACETAMINOPHEN 325 MG/1
650 TABLET ORAL EVERY 6 HOURS PRN
Status: DISCONTINUED | OUTPATIENT
Start: 2024-08-20 | End: 2024-08-21 | Stop reason: HOSPADM

## 2024-08-19 RX ORDER — IOPAMIDOL 755 MG/ML
100 INJECTION, SOLUTION INTRAVASCULAR
Status: COMPLETED | OUTPATIENT
Start: 2024-08-19 | End: 2024-08-19

## 2024-08-19 RX ORDER — ENOXAPARIN SODIUM 100 MG/ML
40 INJECTION SUBCUTANEOUS EVERY 24 HOURS
Status: DISCONTINUED | OUTPATIENT
Start: 2024-08-20 | End: 2024-08-21 | Stop reason: HOSPADM

## 2024-08-19 RX ORDER — SODIUM CHLORIDE 0.9 % (FLUSH) 0.9 %
10 SYRINGE (ML) INJECTION
Status: DISCONTINUED | OUTPATIENT
Start: 2024-08-20 | End: 2024-08-21 | Stop reason: HOSPADM

## 2024-08-19 RX ORDER — CLOPIDOGREL BISULFATE 75 MG/1
300 TABLET ORAL
Status: COMPLETED | OUTPATIENT
Start: 2024-08-19 | End: 2024-08-19

## 2024-08-19 RX ORDER — LABETALOL HYDROCHLORIDE 5 MG/ML
10 INJECTION, SOLUTION INTRAVENOUS
Status: DISCONTINUED | OUTPATIENT
Start: 2024-08-20 | End: 2024-08-21 | Stop reason: HOSPADM

## 2024-08-19 RX ORDER — HYDRALAZINE HYDROCHLORIDE 20 MG/ML
10 INJECTION INTRAMUSCULAR; INTRAVENOUS EVERY 6 HOURS PRN
Status: DISCONTINUED | OUTPATIENT
Start: 2024-08-20 | End: 2024-08-21 | Stop reason: HOSPADM

## 2024-08-19 RX ADMIN — ASPIRIN 81 MG CHEWABLE TABLET 324 MG: 81 TABLET CHEWABLE at 02:08

## 2024-08-19 RX ADMIN — IOPAMIDOL 100 ML: 755 INJECTION, SOLUTION INTRAVENOUS at 01:08

## 2024-08-19 RX ADMIN — CLOPIDOGREL BISULFATE 300 MG: 75 TABLET ORAL at 02:08

## 2024-08-19 NOTE — TELEMEDICINE CONSULT
[facility]  Vascular Neurology  Comprehensive Stroke Center  TeleVascular Neurology Acute Consultation Note        Consult Information  Consult to Telemedicine - Acute Stroke  Consult performed by: Samina Brewer MD  Consult ordered by: Julius Baez MD          Consulting Provider: JULIUS BAEZ   Current Providers  No providers found    Patient Location:  [facility] EMERGENCY DEPARTMENT Emergency Department    Spoke hospital nurse at bedside with patient assisting consultant.  Patient information was obtained from patient, relative(s), and past medical records.       Stroke Documentation  Acute Stroke Times   Last Known Normal Date: 08/19/24  Last Known Normal Time: 0700  Symptom Onset Date: 08/19/24  Unknown Symptom Onset Time: Unknown Time  Stroke Team Called Date: 08/19/24  Stroke Team Called Time: 1310  Stroke Team Arrival Date: 08/19/24  Stroke Team Arrival Time: 1324  CT Interpretation Time: 1329  Thrombolytic Therapy Recommended: No  CTA Interpretation Time: 1330  Thrombectomy Recommended: No    NIH Scale:  1a. Level of Consciousness: 0-->Alert, keenly responsive  1b. LOC Questions: 0-->Answers both questions correctly  1c. LOC Commands: 0-->Performs both tasks correctly  2. Best Gaze: 0-->Normal  3. Visual: 0-->No visual loss  4. Facial Palsy: 2-->Partial paralysis (total or near-total paralysis of lower face)  5a. Motor Arm, Left: 0-->No drift, limb holds 90 (or 45) degrees for full 10 secs  5b. Motor Arm, Right: 0-->No drift, limb holds 90 (or 45) degrees for full 10 secs  6a. Motor Leg, Left: 0-->No drift, leg holds 30 degree position for full 5 secs  6b. Motor Leg, Right: 0-->No drift, leg holds 30 degree position for full 5 secs  7. Limb Ataxia: 0-->Absent  8. Sensory: 0-->Normal, no sensory loss  9. Best Language: 0-->No aphasia, normal  10. Dysarthria: 2-->Severe dysarthria, patients speech is so slurred as to be unintelligible in the absence of or out of proportion to  "any dysphasia, or is mute/anarthric  11. Extinction and Inattention (formerly Neglect): 0-->No abnormality  Total (NIH Stroke Scale): 4      Modified Harlan: Score: 2  [place] Coma Scale:     ABCD2 Score:    [id number] Score:    HAS -BLED Score:    ICH Score:    Hunt & Meehan Classification:      Blood pressure (!) 152/78, pulse 72, temperature 96.8 °F (36 °C), resp. rate 18, height 6' 1" (1.854 m), weight 61.2 kg (135 lb), SpO2 98%.      In my opinion, this was a: Tier 1; VAN Stroke Assessment: Negative     Medical Decision Making  HPI:  83 y.o. male w/ PMHx of Prostate Ca and multiple cortical infarcts, chronic who presents w/ reported aphasia and RFD.   LKW around 07:00 AM when he spoke to his wife prior to her going to HD.   He was found by family crying w/ significant issues getting words out and w/ RFD later I the afternoon.          Images personally reviewed and interpreted:  Study: Head CT and CTA Head & Neck  Study Interpretation: No acute intracranial abnormalities, specifically no intracranial hemorrhage. Possible loss of grey/white differentiation in the L temporal lobe.   No LVO, no hemodynamically significant stenosis.           Assessment and plan:  NIHSS 4.   OOW for TNK based on LKW  Symptoms have significnatly improved since his arrival, as in aphasia is largely resolved.     No evidence of proximal LVO on CTA H/N  Concern for undiagnosed Afib due to multiple previous cortical infarcts.   Recommend admission for stroke w/u and observation.  Recommendations:  MRI brain to evaluate for presence and/or extent of ischemic injury  Allow for permissive HTNsion up to 220/110 until AIS is r/o w/ imaging   Lipid profile, A1c, TSH  ECHO w/o bubble study  PT/OT/SLP eval and Rx  IVF to allow for maximum cerebral perfusion  HOB flat   Load aspirin 325 mg & clopidogrel 300 mg x 1 now, followed by daily aspirin 81 mg /  clopidogrel 75 mg x 30 days followed by monotherapy thereafter  High intensity statin for LDL " goal <70 long term     Recommendations discussed w/ Dr. Gonzalez.      Lytics recommendation: Thrombolytic therapy not recommended due to Outside of treatment window   Thrombectomy recommendation: No; No large vessel occlusion identified on imaging   Placement recommendation: pending further studies  admit to inpatient           No noted aphasia  No Evidence of focal weakness aside from RFD  Dysarthria  No sensory deficits   Follows commands w/ ease although hard of hearing    Past Medical History:   Diagnosis Date    Coronary stent patent     Heart attack     High cholesterol     Prostate CA      Past Surgical History:   Procedure Laterality Date    BACK SURGERY      CORONARY STENT PLACEMENT      CYSTOSCOPY W/ RETROGRADES Bilateral 6/20/2023    Procedure: CYSTOSCOPY, WITH RETROGRADE PYELOGRAM;  Surgeon: Jonathan Houser MD;  Location: [facility];  Service: Urology;  Laterality: Bilateral;    CYSTOSCOPY WITH CALCULUS EXTRACTION N/A 6/20/2023    Procedure: CYSTOSCOPY, WITH CALCULUS REMOVAL;  Surgeon: Jonathan Houser MD;  Location: [facility];  Service: Urology;  Laterality: N/A;  URETHRAL STRICTURE, URETHRAL CALCULUS    INTERNAL URETHROTOMY N/A 6/20/2023    Procedure: URETHROTOMY, INTERNAL;  Surgeon: Jonathan Houser MD;  Location: [facility];  Service: Urology;  Laterality: N/A;    PHACOEMULSIFICATION, CATARACT, WITH IOL INSERTION Right 10/31/2023    Procedure: PHACOEMULSIFICATION, CATARACT, WITH IOL INSERTION- OD;  Surgeon: Gilbert Hensley MD;  Location: [facility];  Service: Ophthalmology;  Laterality: Right;    PHACOEMULSIFICATION, CATARACT, WITH IOL INSERTION Left 11/28/2023    Procedure: PHACOEMULSIFICATION, CATARACT, WITH IOL INSERTION- OS;  Surgeon: Gilbert Hensley MD;  Location: [facility];  Service: Ophthalmology;  Laterality: Left;     Family History   Problem Relation Name Age of Onset    Cancer Mother      Diabetes Mother      No Known Problems Father         Diagnoses  No problems updated.    Samina Brewer,  MD      Emergent/Acute neurological consultation requested by spoke provider due to critical concerns for possible cerebrovascular event that could result in permanent loss of neurologic/bodily function, severe disability or death of this patient.  Immediate/timely evaluation by a highly prepared expert is paramount for optimal outcomes  High risk for neurological deterioration if not properly diagnosed  High risk for neurological deterioration if not treated promplty/as soon as possible  Complex diagnostic evaluation may be required (advanced imaging)  High risk treatment options (thrombolytics and/or thrombectomy)    Patient care was coordinated with spoke provider, including but not limted to    Discussing likely diagnosis/etiology of symptoms  Making recommendations for further diagnostic studies  Making recommendations for intravenous thrombolytics or other advanced therapies  Making recommendations for disposition (admission/transfer for higher level of care)

## 2024-08-19 NOTE — ED PROVIDER NOTES
Encounter Date: 8/19/2024       History     Chief Complaint   Patient presents with    Cerebrovascular Accident     Slurred speech and unable to speak at times.  LNW 10am today      HPI    83-year-old male with a past medical history of CAD with stents, hypertension, hyperlipidemia and prostate cancer presents emergency department for facial droop and difficulty speaking that started at 10:00 a.m. this morning, 3 hours ago.  Family states that they noticed he tried to call and was having hard time understanding him and was crying.  Patient point it at 10:00 a.m. on his watch stating that is the time it started.  Family concurs with this.    After further talking with the patient's family last known normal that he was witnessed was 7:00 a.m..  Supposedly she might talked to him at 9:00 a.m. and he was speaking fine then.    Patient was taken off of Plavix 2 weeks ago    Review of patient's allergies indicates:   Allergen Reactions    Morphine      Past Medical History:   Diagnosis Date    Coronary stent patent     Heart attack     High cholesterol     Prostate CA      Past Surgical History:   Procedure Laterality Date    BACK SURGERY      CORONARY STENT PLACEMENT      CYSTOSCOPY W/ RETROGRADES Bilateral 6/20/2023    Procedure: CYSTOSCOPY, WITH RETROGRADE PYELOGRAM;  Surgeon: Jonathan Houser MD;  Location: [facility];  Service: Urology;  Laterality: Bilateral;    CYSTOSCOPY WITH CALCULUS EXTRACTION N/A 6/20/2023    Procedure: CYSTOSCOPY, WITH CALCULUS REMOVAL;  Surgeon: Jonathan Houser MD;  Location: [facility];  Service: Urology;  Laterality: N/A;  URETHRAL STRICTURE, URETHRAL CALCULUS    INTERNAL URETHROTOMY N/A 6/20/2023    Procedure: URETHROTOMY, INTERNAL;  Surgeon: Jonathan Houser MD;  Location: [facility];  Service: Urology;  Laterality: N/A;    PHACOEMULSIFICATION, CATARACT, WITH IOL INSERTION Right 10/31/2023    Procedure: PHACOEMULSIFICATION, CATARACT, WITH IOL INSERTION- OD;  Surgeon: Gilbert Hensley MD;   Location: [facility] OR;  Service: Ophthalmology;  Laterality: Right;    PHACOEMULSIFICATION, CATARACT, WITH IOL INSERTION Left 11/28/2023    Procedure: PHACOEMULSIFICATION, CATARACT, WITH IOL INSERTION- OS;  Surgeon: Gilbert Hensley MD;  Location: [facility] OR;  Service: Ophthalmology;  Laterality: Left;     Family History   Problem Relation Name Age of Onset    Cancer Mother      Diabetes Mother      No Known Problems Father       Social History     Tobacco Use    Smoking status: Every Day     Current packs/day: 1.00     Types: Cigarettes    Smokeless tobacco: Never   Substance Use Topics    Alcohol use: Not Currently     Review of Systems   Unable to perform ROS: Acuity of condition   Constitutional:  Negative for fever.   Cardiovascular:  Negative for chest pain.   Gastrointestinal:  Negative for vomiting.   Neurological:  Positive for speech difficulty. Negative for headaches.   All other systems reviewed and are negative.      Physical Exam     Initial Vitals [08/19/24 1306]   BP Pulse Resp Temp SpO2   (!) 152/78 72 18 96.8 °F (36 °C) 98 %      MAP       --         Physical Exam    Nursing note and vitals reviewed.  Constitutional: He appears well-developed and well-nourished. No distress.   Eyes: EOM are normal. Pupils are equal, round, and reactive to light.   Cardiovascular:  Normal rate and regular rhythm.           Pulmonary/Chest: Breath sounds normal. No respiratory distress.   Abdominal: Abdomen is soft.     Neurological: He is alert. GCS eye subscore is 4. GCS verbal subscore is 5. GCS motor subscore is 6.   Significant aphasia, right-sided facial droop.  Extremities upper and lower are 5/5 bilaterally.  Tongue protrudes midline.   Skin: Skin is warm.         ED Course   Critical Care    Date/Time: 8/19/2024 5:45 PM    Performed by: Tono Gonzalez MD  Authorized by: Tono Gonzalez MD  Direct patient critical care time: 50 minutes  Total critical care time (exclusive of procedural time) : 50  minutes  Critical care time was exclusive of separately billable procedures and treating other patients.  Critical care was necessary to treat or prevent imminent or life-threatening deterioration of the following conditions: CNS failure or compromise.  Critical care was time spent personally by me on the following activities: development of treatment plan with patient or surrogate, discussions with consultants, interpretation of cardiac output measurements, evaluation of patient's response to treatment, examination of patient, obtaining history from patient or surrogate, ordering and performing treatments and interventions, ordering and review of laboratory studies, ordering and review of radiographic studies, re-evaluation of patient's condition and review of old charts.        Labs Reviewed   COMPREHENSIVE METABOLIC PANEL - Abnormal       Result Value    Sodium 139      Potassium 4.2      Chloride 108 (*)     CO2 23      Glucose 100      Blood Urea Nitrogen 21.0      Creatinine 0.99      Calcium 9.8      Protein Total 8.0 (*)     Albumin 3.7      Globulin 4.3 (*)     Albumin/Globulin Ratio 0.9 (*)     Bilirubin Total 0.4      ALP 98      ALT 12      AST 18      eGFR >60      Anion Gap 8.0      BUN/Creatinine Ratio 21     CBC WITH DIFFERENTIAL - Abnormal    WBC 6.95      RBC 3.23 (*)     Hgb 11.0 (*)     Hct 32.5 (*)     .6 (*)     MCH 34.1 (*)     MCHC 33.8      RDW 13.7      Platelet 240      MPV 9.9      Neut % 47.4      Lymph % 43.7      Mono % 6.6      Eos % 1.6      Basophil % 0.4      Lymph # 3.04      Neut # 3.29      Mono # 0.46      Eos # 0.11      Baso # 0.03      IG# 0.02      IG% 0.3     PROTIME-INR - Normal    PT 13.6      INR 1.0     TSH - Normal    TSH 1.954     APTT - Normal    PTT 29.4     CBC W/ AUTO DIFFERENTIAL    Narrative:     The following orders were created for panel order CBC W/ AUTO DIFFERENTIAL.  Procedure                               Abnormality         Status                      ---------                               -----------         ------                     CBC with Differential[[id number]]       Abnormal            Final result                 Please view results for these tests on the individual orders.   LIPID PANEL    Cholesterol Total 107      HDL Cholesterol 35      Triglyceride 90      Cholesterol/HDL Ratio 3      Very Low Density Lipoprotein 18      LDL Cholesterol 54.00     HEMOGLOBIN A1C    Hemoglobin A1c 5.1      Estimated Average Glucose 99.7     POCT GLUCOSE, HAND-HELD DEVICE   POCT GLUCOSE    POCT Glucose 94       EKG Readings: (Independently Interpreted)   Initial Reading: No STEMI. Rhythm: Normal Sinus Rhythm. Heart Rate: 60. Ectopy: No Ectopy. Conduction: Normal. ST Segments: Normal ST Segments. T Waves: Normal. Clinical Impression: Normal Sinus Rhythm     ECG Results              ECG 12 lead (Final result)        Collection Time Result Time QRS Duration OHS QTC Calculation    08/19/24 13:24:17 08/19/24 16:45:01 90 424                     Final result by Interface, Lab In [facility] (08/19/24 16:45:05)                   Narrative:    Test Reason : R29.818,    Vent. Rate : 060 BPM     Atrial Rate : 060 BPM     P-R Int : 176 ms          QRS Dur : 090 ms      QT Int : 424 ms       P-R-T Axes : 083 074 078 degrees     QTc Int : 424 ms    Normal sinus rhythm  Normal ECG  When compared with ECG of 19-JUN-2023 13:12,  Premature ventricular complexes are no longer Present  Confirmed by Jean-Claude Anderson MD ([id number]) on 8/19/2024 4:44:59 PM    Referred By: [email or web address]   SELF           Confirmed By:Jean-Claude Anderson MD                                  Imaging Results              MRI Brain Without Contrast (Final result)  Result time 08/19/24 16:10:38      Final result by Nito Hughes MD (08/19/24 16:10:38)                   Impression:      1. Findings compatible with the at least 2 small foci of acute ischemia at the left temporal lobe/insula measuring 1 cm in diameter each  2.  Generalized cerebral and cerebellar atrophy  3. 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 Findings and other details as above      Electronically signed by: Nito Hughes  Date:    08/19/2024  Time:    16:10               Narrative:    EXAMINATION:  MRI BRAIN WITH CONTRAST:    CLINICAL HISTORY:  , Stroke, follow up;    COMPARISON:  07/08/2019    FINDINGS:  Serial axial,coronal, and sagittal 1.5 Jemima images were obtained of the brain using T1 and T2- weighted techniques the administration of IV contrast.  There is mild motion artifact.  Ventricles, cisterns, and sulci are prominent in size.  There is no evidence of midline shift, mass effect, or abnormal extra-axial fluid collection.  Flow void is noted to the superior sagittal sinus and visualized intracranial vessels on T2 sequence imaging.  Left vertebral artery is dominant.  Parenchymal defects are again evident at the posterior right parietal lobe, right frontal lobe, and right cerebellum.  There is scattered mucosal thickening throughout the paranasal sinuses.  Mastoid air cells are relatively clear.  Orbital globes are symmetric in size.  Cerebellar tonsils extend caudally to the level of foramen magnum.  The sella turcica is mostly empty.  There are few small foci of abnormal signal intensity at the left the temporal lobe measuring up to 1 cm in diameter each compatible small foci of acute ischemia. Scattered foci of abnormal signal intensity noted to the periventricular and subcortical white matter on FLAIR sequence imaging. There is additional similar abnormal signal intensity surrounding the parenchymal defect at the right frontal lobe, right parietal lobe, and right cerebellum.  A round focus of abnormal signal intensity is again noted to the right periventricular white matter/insula measuring 1.5 cm in diameter; not significantly changed.                                       CTA  Head and Neck (xpd) (Final result)  Result time 08/19/24 14:19:02      Final result by Nito Hughes MD (08/19/24 14:19:02)                   Impression:      1. Mild dominant left M3 segment compared to the right  2. Dominant left vertebral artery with diminutive right vertebral artery  3. Mild localized plaque formation at the carotid bulbs bilaterally and at the cavernous portions of the internal carotid arteries bilaterally with stenosis estimated at less than 30%  4. Nonvisualization of the posterior communicating arteries bilaterally.      Electronically signed by: Nito Hughes  Date:    08/19/2024  Time:    14:19               Narrative:    EXAMINATION:  CTA HEAD:    CLINICAL HISTORY:  , Neuro deficit, acute, stroke suspected;    TECHNIQUE:  PATIENT RADIATION DOSE: DLP(mGycm) 1633    As per PQRS measures, all CT scans at this facility used dose modulation, iterative reconstruction, and/or weight based dose adjustment when appropriate to reduce radiation dose to as low as reasonably achievable.    COMPARISON:  None available    FINDINGS:  Serial axial images were obtained with the administration of IV contrast. Additional sagittal and coronal reconstructions were performed. 3-D angiographic reconstructions were performed of the head. NASCET criteria was utilized. No obvious aneurysmal dilatation is seen.  No abnormal mass effect is appreciated.  A large low-attenuation focus is 3.1 cm.  Smaller low-attenuation foci are noted to the right thyroid lobe.  Multinodular atelectasis and or scarring is evident at the lung apices.  A few small blebs of gas are noted to posterior right lateral to the trachea.  Atherosclerosis noted to the aorta.  The ascending aorta is mildly prominent in size measuring 3.5 cm in transverse diameter.    Common carotid arteries: Symmetric in size and caliber there is localized plaque formation at the carotid bulbs bilaterally with stenosis estimated at less than 30%.    Internal  carotid arteries: Symmetric in size and caliber mild scattered plaque formation is evident at the cavernous portions bilaterally with stenosis estimated at less than 30%.    Anterior cerebral arteries: Symmetric in size and caliber    Vertebral arteries: Dominant left vertebral artery with diminutive right vertebral artery    Middle cerebral arteries: The left M3 segment is mildly dominant over the right    Basilar artery: Normal in size and caliber    Posterior cerebral arteries: Symmetric in size and caliber    Posterior communicating arteries: Not identified with certainty                                       CT Head Without Contrast (Final result)  Result time 08/19/24 13:15:27      Final result by Nito Hughes MD (08/19/24 13:15:27)                   Impression:      1. No acute intracranial abnormality identified  2. Generalized cerebral and cerebellar atrophy  3. Parenchymal defect/old infarct/encephalomalacia at the right cerebellum, posterior right parietal lobe, and right frontal lobe  4. Findings and other details as above      Electronically signed by: Nito Hughes  Date:    08/19/2024  Time:    13:15               Narrative:    EXAMINATION:  CT HEAD WITHOUT CONTRAST    CLINICAL HISTORY:  Neuro deficit, acute, stroke suspected;, .    TECHNIQUE:  PATIENT RADIATION DOSE: DLP(mGycm) 1118    As per PQRS measures, all CT scans at this facility used dose modulation, iterative reconstruction, and/or weight based dose adjustment when appropriate to reduce radiation dose to as low as reasonably achievable.    COMPARISON:  07/07/2019    FINDINGS:  Serial axial images were obtained of the head without the administration of IV contrast. Both brain and bone parenchymal windows were obtained.  Additional coronal and sagittal reconstructions were obtained.  Ventricles, cisterns, and sulci are prominent in size.  There is no evidence of intracranial hemorrhage, midline shift, mass effect, or abnormal extra-axial  fluid collections.  Parenchymal defect is evident at the right the cerebellum, posterior right parietal lobe, and right frontal lobe compatible with old infarct/encephalomalacia.  Scattered hypodensities are seen within the paratracheal white matter suspicious for small vessel ischemic changes.  Minimal intracranial atherosclerosis is identified.  Cerebellar tonsils extend caudally to the level of foramen magnum.  There is beam hardening artifact at the skull base.  There is scattered mucosal thickening at the paranasal sinuses.  Small air-fluid levels noted to the right maxillary sinus.  Mastoid air cells are aerated bilaterally.  Filling defects are evident at the external auditory canals bilaterally suggesting cerumen.  Clinical correlation is indicated.  Bony structures are osteopenic.                                       Medications   iopamidoL (ISOVUE-370) injection 100 mL (100 mLs Intravenous Given 8/19/24 1316)   aspirin chewable tablet 324 mg (324 mg Oral Given 8/19/24 1402)   clopidogreL tablet 300 mg (300 mg Oral Given 8/19/24 1404)     Medical Decision Making  Initial Assessment:   CVA    Differential Diagnosis:   Judging by the patient's chief complaint and pertinent history, the patient has the following possible differential diagnoses, including but not limited to the following.  Some of these are deemed to be lower likelihood and some more likely based on my physical exam and history combined with possible lab work and/or imaging studies.   Please see the pertinent studies, and refer to the HPI.  Some of these diagnoses will take further evaluation to fully rule out, perhaps as an outpatient and the patient was encouraged to follow up when discharged for more comprehensive evaluation.  CVA, hemorrhagic stroke, electrolyte abnormality,  as well as multiple other possible etiologies      Amount and/or Complexity of Data Reviewed  Labs: ordered. Decision-making details documented in ED  Course.  Radiology: ordered. Decision-making details documented in ED Course.    Risk  OTC drugs.  Prescription drug management.      Additional MDM:     NIH Stroke Scale:   Interval = initial 15 minute assessment from arrival  Level of consciousness = 0 - alert  LOC questions = 0 - answers both correctly  LOC commands = 0 - performs both correctly  Best gaze = 0 - normal  Visual = 0 - no visual loss  Facial palsy = 2 - partial  Motor left arm =  0 - no drift  Motor right arm =  0 - no drift  Motor left leg = 0 - no drift  Motor right leg =  0 - no drift  Limb ataxia = 0 - absent  Sensory = 0 - normal  Best language = 2 - severe aphasia  Dysarthria = 1 - mild to moderate dysarthria  Extinction and inattention = 0 - no neglect  NIH Stroke Scale Total = 5              ED Course as of 08/19/24 1952   Mon Aug 19, 2024   1321 WBC: 6.95 [BS]   1321 Hemoglobin(!): 11.0 [BS]   1321 Hematocrit(!): 32.5 [BS]   1321 Platelet Count: 240 [BS]   1336 Stroke team evaluated the patient.  No TNK secondary to on exact timeframe of onset with improvement of symptoms [BS]   1342 Patient with improvement in speech [BS]   1453 Dr Ye wants to wait for the MRI read prior to admission [BS]   1700 MRI Brain Without Contrast [BS]   1712 Primary care requesting transfer to a facility with Neurology [BS]   1745 Speech continuing to improve [BS]   1845 I, P Sergio Phillips MD, assumed care at 1800.  Agree with above care plan and documentation.   Pt seen and examined.  Awaiting to hear from transfer center on accepting facility for patient to be evaluated by Neurology [PL]   1945 Spoke with heather Gracia was with hospital medicine at [facility] who accepts for transfer under the service of Dr. Jalen Claros [PL]      ED Course User Index  [BS] Tono Gonzalez MD  [PL] Chapo Phillips MD                             Clinical Impression:  Final diagnoses:  [R29.818] Acute focal neurological deficit  [I63.9] Cerebrovascular  accident (CVA), unspecified mechanism (Primary)          ED Disposition Condition    Transfer to Another Facility Stable                Chapo Phillips MD  08/19/24 1952

## 2024-08-19 NOTE — SUBJECTIVE & OBJECTIVE
HPI:  83 y.o. male w/ PMHx of Prostate Ca and multiple cortical infarcts, chronic who presents w/ reported aphasia and RFD.   LKW around 07:00 AM when he spoke to his wife prior to her going to HD.   He was found by family crying w/ significant issues getting words out and w/ RFD later I the afternoon.        Images personally reviewed and interpreted:  Study: Head CT and CTA Head & Neck  Study Interpretation: No acute intracranial abnormalities, specifically no intracranial hemorrhage. Possible loss of grey/white differentiation in the L temporal lobe.   No LVO, no hemodynamically significant stenosis.           Assessment and plan:  NIHSS 4.   OOW for TNK based on LKW  No evidence of proximal LVO on CTA H/N  Concern for undiagnosed Afib due to multiple previous cortical infarcts.   Recommend admission for stroke w/u and observation.  Recommendations:  MRI brain to evaluate for presence and/or extent of ischemic injury  Allow for permissive HTNsion up to 220/110 until AIS is r/o w/ imaging   Lipid profile, A1c, TSH  ECHO w/o bubble study  PT/OT/SLP eval and Rx  IVF to allow for maximum cerebral perfusion  HOB flat   Load aspirin 325 mg & clopidogrel 300 mg x 1 now, followed by daily aspirin 81 mg /  clopidogrel 75 mg x 30 days followed by monotherapy thereafter  High intensity statin for LDL goal <70 long term     Recommendations discussed w/ Dr. Gonzalez.      Lytics recommendation: Thrombolytic therapy not recommended due to Outside of treatment window   Thrombectomy recommendation: No; No large vessel occlusion identified on imaging   Placement recommendation: pending further studies  admit to inpatient

## 2024-08-20 PROBLEM — I63.9 STROKE: Status: ACTIVE | Noted: 2024-08-20

## 2024-08-20 LAB
ALBUMIN SERPL-MCNC: 3.5 G/DL (ref 3.4–4.8)
ALBUMIN/GLOB SERPL: 0.9 RATIO (ref 1.1–2)
ALP SERPL-CCNC: 97 UNIT/L (ref 40–150)
ALT SERPL-CCNC: 11 UNIT/L (ref 0–55)
ANION GAP SERPL CALC-SCNC: 7 MEQ/L
AST SERPL-CCNC: 17 UNIT/L (ref 5–34)
BASOPHILS # BLD AUTO: 0.02 X10(3)/MCL
BASOPHILS NFR BLD AUTO: 0.3 %
BILIRUB SERPL-MCNC: 0.6 MG/DL
BUN SERPL-MCNC: 15 MG/DL (ref 8.4–25.7)
CALCIUM SERPL-MCNC: 9.5 MG/DL (ref 8.8–10)
CHLORIDE SERPL-SCNC: 107 MMOL/L (ref 98–107)
CHOLEST SERPL-MCNC: 104 MG/DL
CHOLEST/HDLC SERPL: 3 {RATIO} (ref 0–5)
CO2 SERPL-SCNC: 22 MMOL/L (ref 23–31)
CREAT SERPL-MCNC: 0.89 MG/DL (ref 0.73–1.18)
CREAT/UREA NIT SERPL: 17
EOSINOPHIL # BLD AUTO: 0.11 X10(3)/MCL (ref 0–0.9)
EOSINOPHIL NFR BLD AUTO: 1.8 %
ERYTHROCYTE [DISTWIDTH] IN BLOOD BY AUTOMATED COUNT: 13.5 % (ref 11.5–17)
EST. AVERAGE GLUCOSE BLD GHB EST-MCNC: 99.7 MG/DL
GFR SERPLBLD CREATININE-BSD FMLA CKD-EPI: >60 ML/MIN/1.73/M2
GLOBULIN SER-MCNC: 3.8 GM/DL (ref 2.4–3.5)
GLUCOSE SERPL-MCNC: 88 MG/DL (ref 82–115)
HBA1C MFR BLD: 5.1 %
HCT VFR BLD AUTO: 30.9 % (ref 42–52)
HDLC SERPL-MCNC: 39 MG/DL (ref 35–60)
HGB BLD-MCNC: 10.4 G/DL (ref 14–18)
IMM GRANULOCYTES # BLD AUTO: 0.02 X10(3)/MCL (ref 0–0.04)
IMM GRANULOCYTES NFR BLD AUTO: 0.3 %
LDLC SERPL CALC-MCNC: 50 MG/DL (ref 50–140)
LYMPHOCYTES # BLD AUTO: 2.55 X10(3)/MCL (ref 0.6–4.6)
LYMPHOCYTES NFR BLD AUTO: 40.7 %
MCH RBC QN AUTO: 33.5 PG (ref 27–31)
MCHC RBC AUTO-ENTMCNC: 33.7 G/DL (ref 33–36)
MCV RBC AUTO: 99.7 FL (ref 80–94)
MONOCYTES # BLD AUTO: 0.47 X10(3)/MCL (ref 0.1–1.3)
MONOCYTES NFR BLD AUTO: 7.5 %
NEUTROPHILS # BLD AUTO: 3.1 X10(3)/MCL (ref 2.1–9.2)
NEUTROPHILS NFR BLD AUTO: 49.4 %
NRBC BLD AUTO-RTO: 0 %
PLATELET # BLD AUTO: 201 X10(3)/MCL (ref 130–400)
PMV BLD AUTO: 9.5 FL (ref 7.4–10.4)
POTASSIUM SERPL-SCNC: 4.1 MMOL/L (ref 3.5–5.1)
PROT SERPL-MCNC: 7.3 GM/DL (ref 5.8–7.6)
RBC # BLD AUTO: 3.1 X10(6)/MCL (ref 4.7–6.1)
SODIUM SERPL-SCNC: 136 MMOL/L (ref 136–145)
TRIGL SERPL-MCNC: 77 MG/DL (ref 34–140)
VLDLC SERPL CALC-MCNC: 15 MG/DL
WBC # BLD AUTO: 6.27 X10(3)/MCL (ref 4.5–11.5)

## 2024-08-20 PROCEDURE — 80061 LIPID PANEL: CPT | Performed by: NURSE PRACTITIONER

## 2024-08-20 PROCEDURE — 63600175 PHARM REV CODE 636 W HCPCS: Performed by: NURSE PRACTITIONER

## 2024-08-20 PROCEDURE — 92610 EVALUATE SWALLOWING FUNCTION: CPT

## 2024-08-20 PROCEDURE — 99900035 HC TECH TIME PER 15 MIN (STAT)

## 2024-08-20 PROCEDURE — 25000242 PHARM REV CODE 250 ALT 637 W/ HCPCS: Performed by: INTERNAL MEDICINE

## 2024-08-20 PROCEDURE — 21400001 HC TELEMETRY ROOM

## 2024-08-20 PROCEDURE — 85025 COMPLETE CBC W/AUTO DIFF WBC: CPT | Performed by: NURSE PRACTITIONER

## 2024-08-20 PROCEDURE — 80053 COMPREHEN METABOLIC PANEL: CPT | Performed by: NURSE PRACTITIONER

## 2024-08-20 PROCEDURE — 99900031 HC PATIENT EDUCATION (STAT)

## 2024-08-20 PROCEDURE — 25000003 PHARM REV CODE 250: Performed by: NURSE PRACTITIONER

## 2024-08-20 PROCEDURE — 94640 AIRWAY INHALATION TREATMENT: CPT

## 2024-08-20 PROCEDURE — 83036 HEMOGLOBIN GLYCOSYLATED A1C: CPT | Performed by: NURSE PRACTITIONER

## 2024-08-20 PROCEDURE — 97161 PT EVAL LOW COMPLEX 20 MIN: CPT

## 2024-08-20 RX ORDER — ATORVASTATIN CALCIUM 40 MG/1
40 TABLET, FILM COATED ORAL DAILY
Status: DISCONTINUED | OUTPATIENT
Start: 2024-08-20 | End: 2024-08-21 | Stop reason: HOSPADM

## 2024-08-20 RX ORDER — ASPIRIN 81 MG/1
81 TABLET ORAL EVERY MORNING
Status: DISCONTINUED | OUTPATIENT
Start: 2024-08-20 | End: 2024-08-21 | Stop reason: HOSPADM

## 2024-08-20 RX ORDER — LEVALBUTEROL INHALATION SOLUTION 0.63 MG/3ML
0.63 SOLUTION RESPIRATORY (INHALATION)
Status: DISCONTINUED | OUTPATIENT
Start: 2024-08-20 | End: 2024-08-21 | Stop reason: HOSPADM

## 2024-08-20 RX ORDER — CLOPIDOGREL BISULFATE 75 MG/1
75 TABLET ORAL EVERY MORNING
Status: DISCONTINUED | OUTPATIENT
Start: 2024-08-20 | End: 2024-08-21 | Stop reason: HOSPADM

## 2024-08-20 RX ORDER — EZETIMIBE 10 MG/1
10 TABLET ORAL DAILY
Status: DISCONTINUED | OUTPATIENT
Start: 2024-08-20 | End: 2024-08-21 | Stop reason: HOSPADM

## 2024-08-20 RX ADMIN — ENOXAPARIN SODIUM 40 MG: 40 INJECTION SUBCUTANEOUS at 04:08

## 2024-08-20 RX ADMIN — EZETIMIBE 10 MG: 10 TABLET ORAL at 09:08

## 2024-08-20 RX ADMIN — LEVALBUTEROL HYDROCHLORIDE 0.63 MG: 0.63 SOLUTION RESPIRATORY (INHALATION) at 08:08

## 2024-08-20 RX ADMIN — CLOPIDOGREL BISULFATE 75 MG: 75 TABLET ORAL at 09:08

## 2024-08-20 RX ADMIN — ASPIRIN 81 MG: 81 TABLET, COATED ORAL at 09:08

## 2024-08-20 RX ADMIN — LEVALBUTEROL HYDROCHLORIDE 0.63 MG: 0.63 SOLUTION RESPIRATORY (INHALATION) at 04:08

## 2024-08-20 RX ADMIN — ATORVASTATIN CALCIUM 40 MG: 40 TABLET, FILM COATED ORAL at 09:08

## 2024-08-20 NOTE — PT/OT/SLP EVAL
[facility]  Speech Language Pathology Department  Clinical Swallow Evaluation    Patient Name:  Williams Palacios   MRN:  [id number]    Recommendations     General recommendations:  Speech/Language and Cognitive Evaluation  Solid texture recommendation:  Easy to Chew Diet - IDDSI Level 7  Liquid consistency recommendation: Thin liquids - IDDSI Level 0   Medications: crushed in puree  Swallow strategies/precautions: small bites/sips, slow rate, and upright for PO intake  Precautions:      History     Williams Palacios is a 83 y.o. male admitted with a medical diagnosis of L temporal CVA, R facial droop.      Past Medical History:   Diagnosis Date    Coronary stent patent     Heart attack     High cholesterol     Prostate CA      Past Surgical History:   Procedure Laterality Date    BACK SURGERY      CORONARY STENT PLACEMENT      CYSTOSCOPY W/ RETROGRADES Bilateral 6/20/2023    Procedure: CYSTOSCOPY, WITH RETROGRADE PYELOGRAM;  Surgeon: Jonathan Houser MD;  Location: [facility];  Service: Urology;  Laterality: Bilateral;    CYSTOSCOPY WITH CALCULUS EXTRACTION N/A 6/20/2023    Procedure: CYSTOSCOPY, WITH CALCULUS REMOVAL;  Surgeon: Jonathan Houser MD;  Location: [facility];  Service: Urology;  Laterality: N/A;  URETHRAL STRICTURE, URETHRAL CALCULUS    INTERNAL URETHROTOMY N/A 6/20/2023    Procedure: URETHROTOMY, INTERNAL;  Surgeon: Jonathan Houser MD;  Location: [facility];  Service: Urology;  Laterality: N/A;    PHACOEMULSIFICATION, CATARACT, WITH IOL INSERTION Right 10/31/2023    Procedure: PHACOEMULSIFICATION, CATARACT, WITH IOL INSERTION- OD;  Surgeon: Gilbert Hensley MD;  Location: [facility];  Service: Ophthalmology;  Laterality: Right;    PHACOEMULSIFICATION, CATARACT, WITH IOL INSERTION Left 11/28/2023    Procedure: PHACOEMULSIFICATION, CATARACT, WITH IOL INSERTION- OS;  Surgeon: Gilbert Hensley MD;  Location: [facility];  Service: Ophthalmology;  Laterality: Left;       Home diet  texture/consistency: Easy to Chew and thin liquids  Current method of nutrition: NPO    Imaging   Results for orders placed during the hospital encounter of 02/20/23    X-Ray Chest 1 View    Narrative  EXAMINATION:  Single view chest radiograph.    CLINICAL HISTORY:  Chest pain, unspecified    TECHNIQUE:  Single view of the chest.    COMPARISON:  Chest radiograph 05/09/2022.    FINDINGS:  The lungs are clear without consolidation or effusion.  There is no pneumothorax.  The cardiac silhouette is normal in size.  There is no acute osseous abnormality.    Impression  No acute cardiopulmonary abnormality.      Electronically signed by: Jean-Claude Mendoza MD  Date:    02/20/2023  Time:    18:06    No results found for this or any previous visit.    Results for orders placed during the hospital encounter of 08/19/24    MRI Brain Without Contrast    Narrative  EXAMINATION:  MRI BRAIN WITH CONTRAST:    CLINICAL HISTORY:  , Stroke, follow up;    COMPARISON:  07/08/2019    FINDINGS:  Serial axial,coronal, and sagittal 1.5 Jemima images were obtained of the brain using T1 and T2- weighted techniques the administration of IV contrast.  There is mild motion artifact.  Ventricles, cisterns, and sulci are prominent in size.  There is no evidence of midline shift, mass effect, or abnormal extra-axial fluid collection.  Flow void is noted to the superior sagittal sinus and visualized intracranial vessels on T2 sequence imaging.  Left vertebral artery is dominant.  Parenchymal defects are again evident at the posterior right parietal lobe, right frontal lobe, and right cerebellum.  There is scattered mucosal thickening throughout the paranasal sinuses.  Mastoid air cells are relatively clear.  Orbital globes are symmetric in size.  Cerebellar tonsils extend caudally to the level of foramen magnum.  The sella turcica is mostly empty.  There are few small foci of abnormal signal intensity at the left the temporal lobe measuring up to 1 cm in  diameter each compatible small foci of acute ischemia. Scattered foci of abnormal signal intensity noted to the periventricular and subcortical white matter on FLAIR sequence imaging. There is additional similar abnormal signal intensity surrounding the parenchymal defect at the right frontal lobe, right parietal lobe, and right cerebellum.  A round focus of abnormal signal intensity is again noted to the right periventricular white matter/insula measuring 1.5 cm in diameter; not significantly changed.    Impression  1. Findings compatible with the at least 2 small foci of acute ischemia at the left temporal lobe/insula measuring 1 cm in diameter each  2. Generalized cerebral and cerebellar atrophy  3. 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 Findings and other details as above      Electronically signed by: Nito Hughes  Date:    08/19/2024  Time:    16:10    Subjective     Patient awake and alert.    Spiritual/Cultural/Mormonism Beliefs/Practices that affect care: no    Pain/Comfort: Pain Rating 1: 0/10    Respiratory Status:  room air    Objective     ORAL MUSCULATURE  Dentition: missing teeth  Secretion Management: adequate  Facial Movement: reduced right and reduced lower  Buccal Strength & Mobility: impaired  Mandibular Strength & Mobility: impaired  Oral Labial Strength & Mobility: impaired retraction, impaired pursing, and impaired seal  Lingual Strength & Mobility: impaired left lateral movement and impaired right lateral movement  Vocal Quality: adequate    PO TRIALS  Consistency Fed By Oral Symptoms Pharyngeal Symptoms   Thin liquid by cup Self Decreased lip closure  Anterior spillage None   Puree Self None None   Chewable solid Self Prolonged bolus formation/mastication  Oral residue None     Assessment     No signs/sx of aspiration. Initiate PO diet. ST to follow up for cognitive-linguistic evaluation.    Goals      Multidisciplinary Problems       SLP Goals       Not on file                  Education     Patient and family were provided with verbal education regarding ST POC.  Understanding was verbalized, however additional teaching warranted.    Plan     SLP Follow-Up:  Yes   Patient to be seen:  5 x/week   Plan of Care expires:  09/03/24  Plan of Care reviewed with:  patient, family     Time Tracking     SLP Treatment Date:   08/20/24  Speech Start Time:  1330  Speech Stop Time:  1350     Speech Total Time (min):  20 min    Billable minutes:  Swallow and Oral Function Evaluation, 20 minutes     08/20/2024

## 2024-08-20 NOTE — CONSULTS
[facility] - 4th Floor Medical Telemetry  Neurology  Consult Note    Patient Name: Williams Palacios  MRN: [id number]  Admission Date: 8/19/2024  Hospital Length of Stay: 1 days  Code Status: Full Code   Attending Provider: Gregor Claros MD   Consulting Provider: Elvia Pepe NP  Primary Care Physician: Garcia Ye MD  Principal Problem:<principal problem not specified>    Inpatient consult to Neurology  Consult performed by: Elvia Pepe NP  Consult ordered by: Samia Nicole FNP         Subjective:     Chief Complaint:  Right facial droop,speech disturbance.      HPI:   Williams Palacios is a 83 y.o. male with past med history of CAD, dyslipidemia, prostate cancer, prior stroke no residual deficits.  Patient initially presented 8/19/2024 at 10am to the ER in [place] with complaint of speech disturbance.  Patient reports he recalls driving himself to [place] on 8/17 and feeling well. He recalls awakening feeling fine, then later in morning he and his wife felt speech was not clear and wife also noted right facial droop. Unknown last normal.  No upper or lower extremity weakness or numbness.  No vision changes.  No headache.  Right facial droop reported on [facility] exam.  CT head no acute abnormalities identified.  Generalized cerebral and cerebellar atrophy seen.  MRI revealed at least 2 small foci of acute ischemia on the left temporal lobe/insula measuring 1 diameter each.  Old infarct/encephalomalacia again evident at the right frontal, right parietal lobes in the right cerebellum. Vascular neurology consulted for acute stroke.   70 pack year 1 pack a day current smoker, not sure if will quit, may consider. No alcohol. No regular exercise, occasionally goes fishing.          Past Medical History:   Diagnosis Date    Coronary stent patent     Heart attack     High cholesterol     Prostate CA        Past Surgical History:   Procedure Laterality Date    BACK SURGERY      CORONARY  STENT PLACEMENT      CYSTOSCOPY W/ RETROGRADES Bilateral 6/20/2023    Procedure: CYSTOSCOPY, WITH RETROGRADE PYELOGRAM;  Surgeon: Jonathan Houser MD;  Location: [facility] OR;  Service: Urology;  Laterality: Bilateral;    CYSTOSCOPY WITH CALCULUS EXTRACTION N/A 6/20/2023    Procedure: CYSTOSCOPY, WITH CALCULUS REMOVAL;  Surgeon: Jonathan Houser MD;  Location: [facility] OR;  Service: Urology;  Laterality: N/A;  URETHRAL STRICTURE, URETHRAL CALCULUS    INTERNAL URETHROTOMY N/A 6/20/2023    Procedure: URETHROTOMY, INTERNAL;  Surgeon: Jonathan Houesr MD;  Location: [facility];  Service: Urology;  Laterality: N/A;    PHACOEMULSIFICATION, CATARACT, WITH IOL INSERTION Right 10/31/2023    Procedure: PHACOEMULSIFICATION, CATARACT, WITH IOL INSERTION- OD;  Surgeon: Gilbert Hensley MD;  Location: [facility];  Service: Ophthalmology;  Laterality: Right;    PHACOEMULSIFICATION, CATARACT, WITH IOL INSERTION Left 11/28/2023    Procedure: PHACOEMULSIFICATION, CATARACT, WITH IOL INSERTION- OS;  Surgeon: Gilbert Hensley MD;  Location: [facility];  Service: Ophthalmology;  Laterality: Left;       Review of patient's allergies indicates:   Allergen Reactions    Morphine        Current Facility-Administered Medications on File Prior to Encounter   Medication    [COMPLETED] aspirin chewable tablet 324 mg    [COMPLETED] clopidogreL tablet 300 mg    [COMPLETED] iopamidoL (ISOVUE-370) injection 100 mL     Current Outpatient Medications on File Prior to Encounter   Medication Sig    aspirin (ECOTRIN) 81 MG EC tablet Take 1 tablet by mouth every morning.    atorvastatin (LIPITOR) 40 MG tablet Take 40 mg by mouth every evening.    clopidogreL (PLAVIX) 75 mg tablet Take 75 mg by mouth every morning. Stopped 6/18/23    enzalutamide (XTANDI) 80 mg Tab Take 2 tablets by mouth every evening.    ezetimibe (ZETIA) 10 mg tablet Take 10 mg by mouth once daily.    megestroL (MEGACE) 20 MG Tab Take 1 tablet by mouth every morning.    metoprolol tartrate (LOPRESSOR) 50  MG tablet Take 50 mg by mouth every morning.    lactulose (CHRONULAC) 20 gram/30 mL Soln Take 30 mLs (20 g total) by mouth 3 (three) times daily as needed (constipation).    ondansetron (ZOFRAN-ODT) 4 MG TbDL Take 1 tablet (4 mg total) by mouth every 6 (six) hours as needed (nausea).     Family History       Problem Relation (Age of Onset)    Cancer Mother    Diabetes Mother    No Known Problems Father          Tobacco Use    Smoking status: Every Day     Current packs/day: 1.00     Types: Cigarettes    Smokeless tobacco: Never   Substance and Sexual Activity    Alcohol use: Not Currently    Drug use: Not on file    Sexual activity: Not on file     Review of Systems   Constitutional:  Negative for activity change, appetite change, fatigue and fever.   Respiratory:  Positive for cough (chronic 70 pack year current smoker).    Cardiovascular:  Negative for chest pain, palpitations and leg swelling.   Neurological:  Positive for facial asymmetry and speech difficulty (slight slowed). Negative for tremors, seizures, syncope, weakness, light-headedness, numbness and headaches.   Psychiatric/Behavioral:  Negative for agitation, behavioral problems, confusion and decreased concentration. The patient is not nervous/anxious.      Objective:     Vital Signs (Most Recent):  Temp: 98.6 °F (37 °C) (08/20/24 0428)  Pulse: 70 (08/20/24 0428)  Resp: 20 (08/20/24 0428)  BP: (!) 149/65 (08/20/24 0428)  SpO2: 96 % (08/20/24 0428) Vital Signs (24h Range):  Temp:  [96.8 °F (36 °C)-98.6 °F (37 °C)] 98.6 °F (37 °C)  Pulse:  [53-82] 70  Resp:  [10-20] 20  SpO2:  [95 %-99 %] 96 %  BP: (137-164)/(64-78) 149/65        There is no height or weight on file to calculate BMI.     Physical Exam  Vitals and nursing note reviewed.   Constitutional:       General: He is not in acute distress.     Appearance: Normal appearance. He is well-developed. He is not ill-appearing or toxic-appearing.   HENT:      Head: Normocephalic and atraumatic.      Nose:  Nose normal.      Mouth/Throat:      Mouth: Mucous membranes are moist.      Pharynx: Oropharynx is clear. No oropharyngeal exudate.   Eyes:      Extraocular Movements: EOM normal.      Conjunctiva/sclera: Conjunctivae normal.      Pupils: Pupils are equal, round, and reactive to light.   Cardiovascular:      Rate and Rhythm: Normal rate.   Pulmonary:      Effort: Pulmonary effort is normal.      Breath sounds: Normal breath sounds.   Abdominal:      General: Abdomen is flat.   Musculoskeletal:         General: Normal range of motion.      Right lower leg: No edema.      Left lower leg: No edema.   Skin:     General: Skin is warm and dry.      Capillary Refill: Capillary refill takes less than 2 seconds.   Neurological:      Mental Status: He is alert and oriented to person, place, and time.      Coordination: Finger-Nose-Finger Test and Heel to Shin Test normal.      Deep Tendon Reflexes: Reflexes are normal and symmetric.      Reflex Scores:       Tricep reflexes are 2+ on the right side and 2+ on the left side.       Bicep reflexes are 2+ on the right side and 2+ on the left side.       Brachioradialis reflexes are 2+ on the right side and 2+ on the left side.  Psychiatric:         Mood and Affect: Mood normal.         Speech: Speech normal.         Behavior: Behavior normal. Behavior is cooperative.         Thought Content: Thought content normal.         Judgment: Judgment normal.          NEUROLOGICAL EXAMINATION:     MENTAL STATUS   Oriented to person, place, and time.   Registration: recalls 3 of 3 objects.   Attention: normal. Concentration: normal.   Speech: speech is normal (No slurred speech )  Level of consciousness: alert  Knowledge: good.   Able to name object. Normal comprehension.     CRANIAL NERVES     CN II   Visual fields full to confrontation.     CN III, IV, VI   Pupils are equal, round, and reactive to light.  Extraocular motions are normal.     CN V   Facial sensation intact.     CN VII    Right facial weakness: central  Left facial weakness: none    CN IX, X   Palate: symmetric    CN XI   Right trapezius strength: normal    CN XII   Tongue: not atrophic  Fasciculations: absent  Tongue deviation: none    MOTOR EXAM   Muscle bulk: normal  Overall muscle tone: normal    Strength   Right biceps: 5/5  Right triceps: 5/5  Left triceps: 5/5  Right quadriceps: 5/5  Left quadriceps: 5/5  Right anterior tibial: 5/5  Left anterior tibial: 5/5  Right posterior tibial: 5/5  Left posterior tibial: 5/5    REFLEXES     Reflexes   Right brachioradialis: 2+  Left brachioradialis: 2+  Right biceps: 2+  Left biceps: 2+  Right triceps: 2+  Left triceps: 2+    SENSORY EXAM   Light touch normal.   Proprioception normal.     GAIT AND COORDINATION      Coordination   Finger to nose coordination: normal  Heel to shin coordination: normal    Tremor   Resting tremor: absent    Significant Labs:   Recent Lab Results  (Last 5 results in the past 24 hours)        08/20/24  0508   08/19/24  1719   08/19/24  1324   08/19/24  1316   08/19/24  1301        Albumin/Globulin Ratio       0.9         Albumin       3.7         ALP       98         ALT       12         Anion Gap       8.0         PTT       29.4  Comment: For Minimal Heparin Infusion, the goal aPTT 64-85 seconds corresponds to an anti-Xa of 0.3-0.5.    For Low Intensity and High Intensity Heparin, the goal aPTT  seconds corresponds to an anti-Xa of 0.3-0.7         AST       18         Baso # 0.02       0.03         Basophil % 0.3       0.4         BILIRUBIN TOTAL       0.4         BUN       21.0         BUN/CREAT RATIO       21         Calcium       9.8         Chloride       108         Cholesterol Total       107         CO2       23         Creatinine       0.99         eGFR       >60         Eos # 0.11       0.11         Eos % 1.8       1.6         Estimated Avg Glucose 99.7   99.7             Globulin, Total       4.3         Glucose       100         HDL        35         Hematocrit 30.9       32.5         Hemoglobin 10.4       11.0         Hemoglobin A1C External 5.1   5.1             Immature Grans (Abs) 0.02       0.02         Immature Granulocytes 0.3       0.3         INR       1.0         LDL Cholesterol       54.00         Lymph # 2.55       3.04         LYMPH % 40.7       43.7         MCH 33.5       34.1         MCHC 33.7       33.8         MCV 99.7       100.6         Mono # 0.47       0.46         Mono % 7.5       6.6         MPV 9.5       9.9         Neut # 3.10       3.29         Neut % 49.4       47.4         nRBC 0.0               QRS Duration     90           [facility] QTC Calculation     424           Platelet Count 201       240         POCT Glucose         94       Potassium       4.2         PROTEIN TOTAL       8.0         PT       13.6         RBC 3.10       3.23         RDW 13.5       13.7         Sodium       139         Total Cholesterol/HDL Ratio       3         Triglycerides       90         TSH       1.954         Very Low Density Lipoprotein       18         WBC 6.27       6.95                                Significant Imaging: I have reviewed all pertinent imaging results/findings within the past 24 hours.    .MRI Brain Without Contrast  Narrative: EXAMINATION:  MRI BRAIN WITH CONTRAST:    CLINICAL HISTORY:  , Stroke, follow up;    COMPARISON:  07/08/2019    FINDINGS:  Serial axial,coronal, and sagittal 1.5 Jemima images were obtained of the brain using T1 and T2- weighted techniques the administration of IV contrast.  There is mild motion artifact.  Ventricles, cisterns, and sulci are prominent in size.  There is no evidence of midline shift, mass effect, or abnormal extra-axial fluid collection.  Flow void is noted to the superior sagittal sinus and visualized intracranial vessels on T2 sequence imaging.  Left vertebral artery is dominant.  Parenchymal defects are again evident at the posterior right parietal lobe, right frontal lobe, and right  cerebellum.  There is scattered mucosal thickening throughout the paranasal sinuses.  Mastoid air cells are relatively clear.  Orbital globes are symmetric in size.  Cerebellar tonsils extend caudally to the level of foramen magnum.  The sella turcica is mostly empty.  There are few small foci of abnormal signal intensity at the left the temporal lobe measuring up to 1 cm in diameter each compatible small foci of acute ischemia. Scattered foci of abnormal signal intensity noted to the periventricular and subcortical white matter on FLAIR sequence imaging. There is additional similar abnormal signal intensity surrounding the parenchymal defect at the right frontal lobe, right parietal lobe, and right cerebellum.  A round focus of abnormal signal intensity is again noted to the right periventricular white matter/insula measuring 1.5 cm in diameter; not significantly changed.  Impression: 1. Findings compatible with the at least 2 small foci of acute ischemia at the left temporal lobe/insula measuring 1 cm in diameter each  2. Generalized cerebral and cerebellar atrophy  3. 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 Findings and other details as above    Electronically signed by: Nito Hughes  Date:    08/19/2024  Time:    16:10  CTA Head and Neck (xpd)  Narrative: EXAMINATION:  CTA HEAD:    CLINICAL HISTORY:  , Neuro deficit, acute, stroke suspected;    TECHNIQUE:  PATIENT RADIATION DOSE: DLP(mGycm) 1633    As per PQRS measures, all CT scans at this facility used dose modulation, iterative reconstruction, and/or weight based dose adjustment when appropriate to reduce radiation dose to as low as reasonably achievable.    COMPARISON:  None available    FINDINGS:  Serial axial images were obtained with the administration of IV contrast. Additional sagittal and coronal reconstructions were performed. 3-D angiographic reconstructions were performed  of the head. NASCET criteria was utilized. No obvious aneurysmal dilatation is seen.  No abnormal mass effect is appreciated.  A large low-attenuation focus is 3.1 cm.  Smaller low-attenuation foci are noted to the right thyroid lobe.  Multinodular atelectasis and or scarring is evident at the lung apices.  A few small blebs of gas are noted to posterior right lateral to the trachea.  Atherosclerosis noted to the aorta.  The ascending aorta is mildly prominent in size measuring 3.5 cm in transverse diameter.    Common carotid arteries: Symmetric in size and caliber there is localized plaque formation at the carotid bulbs bilaterally with stenosis estimated at less than 30%.    Internal carotid arteries: Symmetric in size and caliber mild scattered plaque formation is evident at the cavernous portions bilaterally with stenosis estimated at less than 30%.    Anterior cerebral arteries: Symmetric in size and caliber    Vertebral arteries: Dominant left vertebral artery with diminutive right vertebral artery    Middle cerebral arteries: The left M3 segment is mildly dominant over the right    Basilar artery: Normal in size and caliber    Posterior cerebral arteries: Symmetric in size and caliber    Posterior communicating arteries: Not identified with certainty  Impression: 1. Mild dominant left M3 segment compared to the right  2. Dominant left vertebral artery with diminutive right vertebral artery  3. Mild localized plaque formation at the carotid bulbs bilaterally and at the cavernous portions of the internal carotid arteries bilaterally with stenosis estimated at less than 30%  4. Nonvisualization of the posterior communicating arteries bilaterally.    Electronically signed by: Nito Hughes  Date:    08/19/2024  Time:    14:19  CT Head Without Contrast  Narrative: EXAMINATION:  CT HEAD WITHOUT CONTRAST    CLINICAL HISTORY:  Neuro deficit, acute, stroke suspected;, .    TECHNIQUE:  PATIENT RADIATION DOSE:  DLP(mGycm) 1118    As per PQRS measures, all CT scans at this facility used dose modulation, iterative reconstruction, and/or weight based dose adjustment when appropriate to reduce radiation dose to as low as reasonably achievable.    COMPARISON:  07/07/2019    FINDINGS:  Serial axial images were obtained of the head without the administration of IV contrast. Both brain and bone parenchymal windows were obtained.  Additional coronal and sagittal reconstructions were obtained.  Ventricles, cisterns, and sulci are prominent in size.  There is no evidence of intracranial hemorrhage, midline shift, mass effect, or abnormal extra-axial fluid collections.  Parenchymal defect is evident at the right the cerebellum, posterior right parietal lobe, and right frontal lobe compatible with old infarct/encephalomalacia.  Scattered hypodensities are seen within the paratracheal white matter suspicious for small vessel ischemic changes.  Minimal intracranial atherosclerosis is identified.  Cerebellar tonsils extend caudally to the level of foramen magnum.  There is beam hardening artifact at the skull base.  There is scattered mucosal thickening at the paranasal sinuses.  Small air-fluid levels noted to the right maxillary sinus.  Mastoid air cells are aerated bilaterally.  Filling defects are evident at the external auditory canals bilaterally suggesting cerumen.  Clinical correlation is indicated.  Bony structures are osteopenic.  Impression: 1. No acute intracranial abnormality identified  2. Generalized cerebral and cerebellar atrophy  3. Parenchymal defect/old infarct/encephalomalacia at the right cerebellum, posterior right parietal lobe, and right frontal lobe  4. Findings and other details as above    Electronically signed by: Nito Hughes  Date:    08/19/2024  Time:    13:15    Assessment and Plan:     Stroke  - presented with right facial droop and speech disturbance. MRI revealed acute ischemia left temporal  lobe/insula.   - Stroke RF: HTN, CAD, prior right sided stroke, no residual deficits  - Intervention: None, out of window, unknown last known normal   - Etiology: TBD    Stroke workup:  -CTh: CTh   Impression:  1. No acute intracranial abnormality identified  2. Generalized cerebral and cerebellar atrophy  3. Parenchymal defect/old infarct/encephalomalacia at the right cerebellum, posterior right parietal lobe, and right frontal lobe  -CTA h/n: Impression:  1. Mild dominant left M3 segment compared to the right  2. Dominant left vertebral artery with diminutive right vertebral artery  3. Mild localized plaque formation at the carotid bulbs bilaterally and at the cavernous portions of the internal carotid arteries bilaterally with stenosis estimated at less than 30%  4. Nonvisualization of the posterior communicating arteries bilaterally.  -MRI brain: Impression:  1. Findings compatible with the at least 2 small foci of acute ischemia at the left temporal lobe/insula measuring 1 cm in diameter each  2. Generalized cerebral and cerebellar atrophy  3. Old infarct/encephalomalacia again evident at the right frontal lobe, right parietal lobe, and right cerebellum  -ECHO: ordered   -CUS: ordered  -LDL: 54   -A1c: 5.1   -TSH: Normal 1.984   -home medications include: Plavix 75, ASA 81, Lipitor 40 mg      Plan:  - continue stroke workup  - Continue Aspirin 81mg daily  - Continue  Atorvastatin 40mg daily  - PT/OT/ST to evaluate        VTE Risk Mitigation (From admission, onward)           Ordered     enoxaparin injection 40 mg  Daily         08/19/24 2346     IP VTE HIGH RISK PATIENT  Once         08/19/24 2346     Place sequential compression device  Until discontinued         08/19/24 2346                    Thank you for your consult. Further recommendations to follow from MD Elvia Pepe, NP  Neurology  [facility] - 4th Floor Medical Telemetry

## 2024-08-20 NOTE — SUBJECTIVE & OBJECTIVE
Past Medical History:   Diagnosis Date    Coronary stent patent     Heart attack     High cholesterol     Prostate CA        Past Surgical History:   Procedure Laterality Date    BACK SURGERY      CORONARY STENT PLACEMENT      CYSTOSCOPY W/ RETROGRADES Bilateral 6/20/2023    Procedure: CYSTOSCOPY, WITH RETROGRADE PYELOGRAM;  Surgeon: Jonathan Houser MD;  Location: [facility];  Service: Urology;  Laterality: Bilateral;    CYSTOSCOPY WITH CALCULUS EXTRACTION N/A 6/20/2023    Procedure: CYSTOSCOPY, WITH CALCULUS REMOVAL;  Surgeon: Jonathan Houser MD;  Location: [facility];  Service: Urology;  Laterality: N/A;  URETHRAL STRICTURE, URETHRAL CALCULUS    INTERNAL URETHROTOMY N/A 6/20/2023    Procedure: URETHROTOMY, INTERNAL;  Surgeon: Jonathan Houser MD;  Location: [facility];  Service: Urology;  Laterality: N/A;    PHACOEMULSIFICATION, CATARACT, WITH IOL INSERTION Right 10/31/2023    Procedure: PHACOEMULSIFICATION, CATARACT, WITH IOL INSERTION- OD;  Surgeon: Gilbert Hensley MD;  Location: [facility];  Service: Ophthalmology;  Laterality: Right;    PHACOEMULSIFICATION, CATARACT, WITH IOL INSERTION Left 11/28/2023    Procedure: PHACOEMULSIFICATION, CATARACT, WITH IOL INSERTION- OS;  Surgeon: Gilbert Hensley MD;  Location: [facility];  Service: Ophthalmology;  Laterality: Left;       Review of patient's allergies indicates:   Allergen Reactions    Morphine        Current Facility-Administered Medications on File Prior to Encounter   Medication    [COMPLETED] aspirin chewable tablet 324 mg    [COMPLETED] clopidogreL tablet 300 mg    [COMPLETED] iopamidoL (ISOVUE-370) injection 100 mL     Current Outpatient Medications on File Prior to Encounter   Medication Sig    aspirin (ECOTRIN) 81 MG EC tablet Take 1 tablet by mouth every morning.    atorvastatin (LIPITOR) 40 MG tablet Take 40 mg by mouth every evening.    clopidogreL (PLAVIX) 75 mg tablet Take 75 mg by mouth every morning. Stopped 6/18/23    enzalutamide (XTANDI) 80 mg Tab Take 2  tablets by mouth every evening.    ezetimibe (ZETIA) 10 mg tablet Take 10 mg by mouth once daily.    megestroL (MEGACE) 20 MG Tab Take 1 tablet by mouth every morning.    metoprolol tartrate (LOPRESSOR) 50 MG tablet Take 50 mg by mouth every morning.    lactulose (CHRONULAC) 20 gram/30 mL Soln Take 30 mLs (20 g total) by mouth 3 (three) times daily as needed (constipation).    ondansetron (ZOFRAN-ODT) 4 MG TbDL Take 1 tablet (4 mg total) by mouth every 6 (six) hours as needed (nausea).     Family History       Problem Relation (Age of Onset)    Cancer Mother    Diabetes Mother    No Known Problems Father          Tobacco Use    Smoking status: Every Day     Current packs/day: 1.00     Types: Cigarettes    Smokeless tobacco: Never   Substance and Sexual Activity    Alcohol use: Not Currently    Drug use: Not on file    Sexual activity: Not on file     Review of Systems   Constitutional:  Negative for activity change, appetite change, fatigue and fever.   Respiratory:  Positive for cough (chronic 70 pack year current smoker).    Cardiovascular:  Negative for chest pain, palpitations and leg swelling.   Neurological:  Positive for facial asymmetry and speech difficulty (slight slowed). Negative for tremors, seizures, syncope, weakness, light-headedness, numbness and headaches.   Psychiatric/Behavioral:  Negative for agitation, behavioral problems, confusion and decreased concentration. The patient is not nervous/anxious.      Objective:     Vital Signs (Most Recent):  Temp: 98.6 °F (37 °C) (08/20/24 0428)  Pulse: 70 (08/20/24 0428)  Resp: 20 (08/20/24 0428)  BP: (!) 149/65 (08/20/24 0428)  SpO2: 96 % (08/20/24 0428) Vital Signs (24h Range):  Temp:  [96.8 °F (36 °C)-98.6 °F (37 °C)] 98.6 °F (37 °C)  Pulse:  [53-82] 70  Resp:  [10-20] 20  SpO2:  [95 %-99 %] 96 %  BP: (137-164)/(64-78) 149/65        There is no height or weight on file to calculate BMI.     Physical Exam  Vitals and nursing note reviewed.    Constitutional:       General: He is not in acute distress.     Appearance: Normal appearance. He is well-developed. He is not ill-appearing or toxic-appearing.   HENT:      Head: Normocephalic and atraumatic.      Nose: Nose normal.      Mouth/Throat:      Mouth: Mucous membranes are moist.      Pharynx: Oropharynx is clear. No oropharyngeal exudate.   Eyes:      Extraocular Movements: EOM normal.      Conjunctiva/sclera: Conjunctivae normal.      Pupils: Pupils are equal, round, and reactive to light.   Cardiovascular:      Rate and Rhythm: Normal rate.   Pulmonary:      Effort: Pulmonary effort is normal.      Breath sounds: Normal breath sounds.   Abdominal:      General: Abdomen is flat.   Musculoskeletal:         General: Normal range of motion.      Right lower leg: No edema.      Left lower leg: No edema.   Skin:     General: Skin is warm and dry.      Capillary Refill: Capillary refill takes less than 2 seconds.   Neurological:      Mental Status: He is alert and oriented to person, place, and time.      Coordination: Finger-Nose-Finger Test and Heel to Shin Test normal.      Deep Tendon Reflexes: Reflexes are normal and symmetric.      Reflex Scores:       Tricep reflexes are 2+ on the right side and 2+ on the left side.       Bicep reflexes are 2+ on the right side and 2+ on the left side.       Brachioradialis reflexes are 2+ on the right side and 2+ on the left side.  Psychiatric:         Mood and Affect: Mood normal.         Speech: Speech normal.         Behavior: Behavior normal. Behavior is cooperative.         Thought Content: Thought content normal.         Judgment: Judgment normal.          NEUROLOGICAL EXAMINATION:     MENTAL STATUS   Oriented to person, place, and time.   Registration: recalls 3 of 3 objects.   Attention: normal. Concentration: normal.   Speech: speech is normal (No slurred speech )  Level of consciousness: alert  Knowledge: good.   Able to name object. Normal comprehension.      CRANIAL NERVES     CN II   Visual fields full to confrontation.     CN III, IV, VI   Pupils are equal, round, and reactive to light.  Extraocular motions are normal.     CN V   Facial sensation intact.     CN VII   Right facial weakness: central  Left facial weakness: none    CN IX, X   Palate: symmetric    CN XI   Right trapezius strength: normal    CN XII   Tongue: not atrophic  Fasciculations: absent  Tongue deviation: none    MOTOR EXAM   Muscle bulk: normal  Overall muscle tone: normal    Strength   Right biceps: 5/5  Right triceps: 5/5  Left triceps: 5/5  Right quadriceps: 5/5  Left quadriceps: 5/5  Right anterior tibial: 5/5  Left anterior tibial: 5/5  Right posterior tibial: 5/5  Left posterior tibial: 5/5    REFLEXES     Reflexes   Right brachioradialis: 2+  Left brachioradialis: 2+  Right biceps: 2+  Left biceps: 2+  Right triceps: 2+  Left triceps: 2+    SENSORY EXAM   Light touch normal.   Proprioception normal.     GAIT AND COORDINATION      Coordination   Finger to nose coordination: normal  Heel to shin coordination: normal    Tremor   Resting tremor: absent    Significant Labs:   Recent Lab Results  (Last 5 results in the past 24 hours)        08/20/24  0508   08/19/24  1719   08/19/24  1324   08/19/24  1316   08/19/24  1301        Albumin/Globulin Ratio       0.9         Albumin       3.7         ALP       98         ALT       12         Anion Gap       8.0         PTT       29.4  Comment: For Minimal Heparin Infusion, the goal aPTT 64-85 seconds corresponds to an anti-Xa of 0.3-0.5.    For Low Intensity and High Intensity Heparin, the goal aPTT  seconds corresponds to an anti-Xa of 0.3-0.7         AST       18         Baso # 0.02       0.03         Basophil % 0.3       0.4         BILIRUBIN TOTAL       0.4         BUN       21.0         BUN/CREAT RATIO       21         Calcium       9.8         Chloride       108         Cholesterol Total       107         CO2       23         Creatinine        0.99         eGFR       >60         Eos # 0.11       0.11         Eos % 1.8       1.6         Estimated Avg Glucose 99.7   99.7             Globulin, Total       4.3         Glucose       100         HDL       35         Hematocrit 30.9       32.5         Hemoglobin 10.4       11.0         Hemoglobin A1C External 5.1   5.1             Immature Grans (Abs) 0.02       0.02         Immature Granulocytes 0.3       0.3         INR       1.0         LDL Cholesterol       54.00         Lymph # 2.55       3.04         LYMPH % 40.7       43.7         MCH 33.5       34.1         MCHC 33.7       33.8         MCV 99.7       100.6         Mono # 0.47       0.46         Mono % 7.5       6.6         MPV 9.5       9.9         Neut # 3.10       3.29         Neut % 49.4       47.4         nRBC 0.0               QRS Duration     90           OHS QTC Calculation     424           Platelet Count 201       240         POCT Glucose         94       Potassium       4.2         PROTEIN TOTAL       8.0         PT       13.6         RBC 3.10       3.23         RDW 13.5       13.7         Sodium       139         Total Cholesterol/HDL Ratio       3         Triglycerides       90         TSH       1.954         Very Low Density Lipoprotein       18         WBC 6.27       6.95                                Significant Imaging: I have reviewed all pertinent imaging results/findings within the past 24 hours.    .MRI Brain Without Contrast  Narrative: EXAMINATION:  MRI BRAIN WITH CONTRAST:    CLINICAL HISTORY:  , Stroke, follow up;    COMPARISON:  07/08/2019    FINDINGS:  Serial axial,coronal, and sagittal 1.5 Jemima images were obtained of the brain using T1 and T2- weighted techniques the administration of IV contrast.  There is mild motion artifact.  Ventricles, cisterns, and sulci are prominent in size.  There is no evidence of midline shift, mass effect, or abnormal extra-axial fluid collection.  Flow void is noted to the superior  sagittal sinus and visualized intracranial vessels on T2 sequence imaging.  Left vertebral artery is dominant.  Parenchymal defects are again evident at the posterior right parietal lobe, right frontal lobe, and right cerebellum.  There is scattered mucosal thickening throughout the paranasal sinuses.  Mastoid air cells are relatively clear.  Orbital globes are symmetric in size.  Cerebellar tonsils extend caudally to the level of foramen magnum.  The sella turcica is mostly empty.  There are few small foci of abnormal signal intensity at the left the temporal lobe measuring up to 1 cm in diameter each compatible small foci of acute ischemia. Scattered foci of abnormal signal intensity noted to the periventricular and subcortical white matter on FLAIR sequence imaging. There is additional similar abnormal signal intensity surrounding the parenchymal defect at the right frontal lobe, right parietal lobe, and right cerebellum.  A round focus of abnormal signal intensity is again noted to the right periventricular white matter/insula measuring 1.5 cm in diameter; not significantly changed.  Impression: 1. Findings compatible with the at least 2 small foci of acute ischemia at the left temporal lobe/insula measuring 1 cm in diameter each  2. Generalized cerebral and cerebellar atrophy  3. 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 Findings and other details as above    Electronically signed by: Nito Hughes  Date:    08/19/2024  Time:    16:10  CTA Head and Neck (xpd)  Narrative: EXAMINATION:  CTA HEAD:    CLINICAL HISTORY:  , Neuro deficit, acute, stroke suspected;    TECHNIQUE:  PATIENT RADIATION DOSE: DLP(mGycm) 1633    As per PQRS measures, all CT scans at this facility used dose modulation, iterative reconstruction, and/or weight based dose adjustment when appropriate to reduce radiation dose to as low as reasonably  achievable.    COMPARISON:  None available    FINDINGS:  Serial axial images were obtained with the administration of IV contrast. Additional sagittal and coronal reconstructions were performed. 3-D angiographic reconstructions were performed of the head. NASCET criteria was utilized. No obvious aneurysmal dilatation is seen.  No abnormal mass effect is appreciated.  A large low-attenuation focus is 3.1 cm.  Smaller low-attenuation foci are noted to the right thyroid lobe.  Multinodular atelectasis and or scarring is evident at the lung apices.  A few small blebs of gas are noted to posterior right lateral to the trachea.  Atherosclerosis noted to the aorta.  The ascending aorta is mildly prominent in size measuring 3.5 cm in transverse diameter.    Common carotid arteries: Symmetric in size and caliber there is localized plaque formation at the carotid bulbs bilaterally with stenosis estimated at less than 30%.    Internal carotid arteries: Symmetric in size and caliber mild scattered plaque formation is evident at the cavernous portions bilaterally with stenosis estimated at less than 30%.    Anterior cerebral arteries: Symmetric in size and caliber    Vertebral arteries: Dominant left vertebral artery with diminutive right vertebral artery    Middle cerebral arteries: The left M3 segment is mildly dominant over the right    Basilar artery: Normal in size and caliber    Posterior cerebral arteries: Symmetric in size and caliber    Posterior communicating arteries: Not identified with certainty  Impression: 1. Mild dominant left M3 segment compared to the right  2. Dominant left vertebral artery with diminutive right vertebral artery  3. Mild localized plaque formation at the carotid bulbs bilaterally and at the cavernous portions of the internal carotid arteries bilaterally with stenosis estimated at less than 30%  4. Nonvisualization of the posterior communicating arteries bilaterally.    Electronically signed  by: Nito Hughes  Date:    08/19/2024  Time:    14:19  CT Head Without Contrast  Narrative: EXAMINATION:  CT HEAD WITHOUT CONTRAST    CLINICAL HISTORY:  Neuro deficit, acute, stroke suspected;, .    TECHNIQUE:  PATIENT RADIATION DOSE: DLP(mGycm) 1118    As per PQRS measures, all CT scans at this facility used dose modulation, iterative reconstruction, and/or weight based dose adjustment when appropriate to reduce radiation dose to as low as reasonably achievable.    COMPARISON:  07/07/2019    FINDINGS:  Serial axial images were obtained of the head without the administration of IV contrast. Both brain and bone parenchymal windows were obtained.  Additional coronal and sagittal reconstructions were obtained.  Ventricles, cisterns, and sulci are prominent in size.  There is no evidence of intracranial hemorrhage, midline shift, mass effect, or abnormal extra-axial fluid collections.  Parenchymal defect is evident at the right the cerebellum, posterior right parietal lobe, and right frontal lobe compatible with old infarct/encephalomalacia.  Scattered hypodensities are seen within the paratracheal white matter suspicious for small vessel ischemic changes.  Minimal intracranial atherosclerosis is identified.  Cerebellar tonsils extend caudally to the level of foramen magnum.  There is beam hardening artifact at the skull base.  There is scattered mucosal thickening at the paranasal sinuses.  Small air-fluid levels noted to the right maxillary sinus.  Mastoid air cells are aerated bilaterally.  Filling defects are evident at the external auditory canals bilaterally suggesting cerumen.  Clinical correlation is indicated.  Bony structures are osteopenic.  Impression: 1. No acute intracranial abnormality identified  2. Generalized cerebral and cerebellar atrophy  3. Parenchymal defect/old infarct/encephalomalacia at the right cerebellum, posterior right parietal lobe, and right frontal lobe  4. Findings and other details  as above    Electronically signed by: Nito Hughes  Date:    08/19/2024  Time:    13:15

## 2024-08-20 NOTE — H&P
"[facility] Medicine - H&P Note    Patient Name: Williams Palacios  : 1940  MRN: [id number]  PCP: Garcia Ye MD  Admitting Physician:  KEYSHA Claros MD  Admission Class: IP- Inpatient   Code status: Full    Allergies   Morphine    Chief Complaint   Slurred speech    History of Present Illness   83-year-old male whose history includes CAD, dyslipidemia, prostate cancer and previous CVA with no deficits.  At the time of my exam patient asleep but easily aroused. Oriented x 3 but somewhat of a poor historian and uncooperative with exam. States he was sleeping good and does not feel like doing anything right now. Initially presented to ER in [place] with c/o speech disturbance. Unclear regarding onset of symptoms. Patient currently reporting he onset was "last week" but ED records show 1000 AM. Denies any unilateral weakness/numbness/tingling, vision changes, headache or confusion.  CT head negative showed generalized cerebral and cerebellar atrophy with old infarcts noted in right cerebellum, posterior right parietal lobe and right frontal lobe with no acute intracranial findings.  CTA head and neck showed mild localized plaque formation at the carotid bulbs bilaterally and at the cavernous portions of the internal carotid arteries bilaterally with stenosis estimated at less than 30%, nonvisualization of the posterior communicating arteries bilaterally.  MRI brain shows findings compatible with at least 2 small foci of acute ischemia of the left temporal lobe/insula measuring 1 cm in diameter each and multiple old infarcts has noted on CT.  Transferred here for Neurology Services. Speech somewhat altered and right facial droop noted but unsure if this is acute and otherwise no focal deficits.     ROS   Except as documented, all other systems reviewed and negative     Past Medical History   CVA - No deficits  Coronary artery disease  Prostate cancer  Bladder outlet " obstruction  Dyslipidemia    Past Surgical History   Back surgery  Coronary stent - details unknown  TURP - 11/3/20  Bilateral cataract extraction   Bilateral retrograde pyelogram with removal of urethral calculus - 6/20/23      Social History   Denies alcohol or illicit drug use.  Current everyday smoker. Lives at home with common-law wife.    Screening for Social Drivers for health:  Patient screened for food insecurity, housing instability, transportation needs, utility difficulties, and interpersonal safety (select all that apply as identified as concern)  []Housing or Food  []Transportation Needs  []Utility Difficulties  []Interpersonal safety  [x]None      Family History   Reviewed and negative    Home Medications     Current Facility-Administered Medications on File Prior to Encounter   Medication Dose Route Frequency Provider Last Rate Last Admin    [COMPLETED] aspirin chewable tablet 324 mg  324 mg Oral ED 1 Time Tono Gonzalez MD   324 mg at 08/19/24 1402    [COMPLETED] clopidogreL tablet 300 mg  300 mg Oral ED 1 Time Tono Gonzalez MD   300 mg at 08/19/24 1404    [COMPLETED] iopamidoL (ISOVUE-370) injection 100 mL  100 mL Intravenous ONCE PRN Tono Gonzalez MD   100 mL at 08/19/24 1316     Current Outpatient Medications on File Prior to Encounter   Medication Sig Dispense Refill    aspirin (ECOTRIN) 81 MG EC tablet Take 1 tablet by mouth every morning.      atorvastatin (LIPITOR) 40 MG tablet Take 40 mg by mouth every evening.      clopidogreL (PLAVIX) 75 mg tablet Take 75 mg by mouth every morning. Stopped 6/18/23      enzalutamide (XTANDI) 80 mg Tab Take 2 tablets by mouth every evening.      ezetimibe (ZETIA) 10 mg tablet Take 10 mg by mouth once daily.      megestroL (MEGACE) 20 MG Tab Take 1 tablet by mouth every morning.      metoprolol tartrate (LOPRESSOR) 50 MG tablet Take 50 mg by mouth every morning.      lactulose (CHRONULAC) 20 gram/30 mL Soln Take 30 mLs (20 g total) by mouth 3  "(three) times daily as needed (constipation). 473 mL 1    ondansetron (ZOFRAN-ODT) 4 MG TbDL Take 1 tablet (4 mg total) by mouth every 6 (six) hours as needed (nausea). 15 tablet 0        Physical Exam   Vital Signs  Temp:  [98.1 °F (36.7 °C)]   Pulse:  [53-82]   Resp:  [10-20]   BP: (137-164)/(64-76)   SpO2:  [95 %-98 %]    General: Appears comfortable  HEENT: NC/AT  Neck:  No JVD  Chest: CTABL  CVS: Regular rhythm. Normal S1/S2.  Abdomen: nondistended, normoactive BS, soft and non-tender.  MSK: No obvious deformity or joint swelling  Skin: Warm and dry  Neuro: AAOx3, mild dysarthria and right facial droop.   Psych: uncooperative    Labs     Recent Labs     08/19/24  1316   WBC 6.95   RBC 3.23*   HGB 11.0*   HCT 32.5*   .6*   MCH 34.1*   MCHC 33.8   RDW 13.7        Recent Labs     08/19/24  1316   INR 1.0      Recent Labs     08/19/24  1316 08/19/24  1719     --    K 4.2  --    CO2 23  --    BUN 21.0  --    CREATININE 0.99  --    EGFRNORACEVR >60  --    GLUCOSE 100  --    CALCIUM 9.8  --    ALBUMIN 3.7  --    GLOBULIN 4.3*  --    ALKPHOS 98  --    ALT 12  --    AST 18  --    BILITOT 0.4  --    HGBA1C  --  5.1   CHOL 107  --    TRIG 90  --    LDL 54.00  --    HDL 35  --    TSH 1.954  --      No results for input(s): "LACTIC" in the last 72 hours.  No results for input(s): "CPK", "TROPONINI" in the last 72 hours.     Microbiology Results (last 7 days)       ** No results found for the last 168 hours. **           Imaging   MRI Brain Without Contrast  Narrative: EXAMINATION:  MRI BRAIN WITH CONTRAST:    CLINICAL HISTORY:  , Stroke, follow up;    COMPARISON:  07/08/2019    FINDINGS:  Serial axial,coronal, and sagittal 1.5 Jemima images were obtained of the brain using T1 and T2- weighted techniques the administration of IV contrast.  There is mild motion artifact.  Ventricles, cisterns, and sulci are prominent in size.  There is no evidence of midline shift, mass effect, or abnormal extra-axial fluid " collection.  Flow void is noted to the superior sagittal sinus and visualized intracranial vessels on T2 sequence imaging.  Left vertebral artery is dominant.  Parenchymal defects are again evident at the posterior right parietal lobe, right frontal lobe, and right cerebellum.  There is scattered mucosal thickening throughout the paranasal sinuses.  Mastoid air cells are relatively clear.  Orbital globes are symmetric in size.  Cerebellar tonsils extend caudally to the level of foramen magnum.  The sella turcica is mostly empty.  There are few small foci of abnormal signal intensity at the left the temporal lobe measuring up to 1 cm in diameter each compatible small foci of acute ischemia. Scattered foci of abnormal signal intensity noted to the periventricular and subcortical white matter on FLAIR sequence imaging. There is additional similar abnormal signal intensity surrounding the parenchymal defect at the right frontal lobe, right parietal lobe, and right cerebellum.  A round focus of abnormal signal intensity is again noted to the right periventricular white matter/insula measuring 1.5 cm in diameter; not significantly changed.  Impression: 1. Findings compatible with the at least 2 small foci of acute ischemia at the left temporal lobe/insula measuring 1 cm in diameter each  2. Generalized cerebral and cerebellar atrophy  3. 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 Findings and other details as above    Electronically signed by: Nito Huhges  Date:    08/19/2024  Time:    16:10  CTA Head and Neck (xpd)  Narrative: EXAMINATION:  CTA HEAD:    CLINICAL HISTORY:  , Neuro deficit, acute, stroke suspected;    TECHNIQUE:  PATIENT RADIATION DOSE: DLP(mGycm) 1633    As per PQRS measures, all CT scans at this facility used dose modulation, iterative reconstruction, and/or weight based dose adjustment when appropriate to reduce radiation dose to  as low as reasonably achievable.    COMPARISON:  None available    FINDINGS:  Serial axial images were obtained with the administration of IV contrast. Additional sagittal and coronal reconstructions were performed. 3-D angiographic reconstructions were performed of the head. NASCET criteria was utilized. No obvious aneurysmal dilatation is seen.  No abnormal mass effect is appreciated.  A large low-attenuation focus is 3.1 cm.  Smaller low-attenuation foci are noted to the right thyroid lobe.  Multinodular atelectasis and or scarring is evident at the lung apices.  A few small blebs of gas are noted to posterior right lateral to the trachea.  Atherosclerosis noted to the aorta.  The ascending aorta is mildly prominent in size measuring 3.5 cm in transverse diameter.    Common carotid arteries: Symmetric in size and caliber there is localized plaque formation at the carotid bulbs bilaterally with stenosis estimated at less than 30%.    Internal carotid arteries: Symmetric in size and caliber mild scattered plaque formation is evident at the cavernous portions bilaterally with stenosis estimated at less than 30%.    Anterior cerebral arteries: Symmetric in size and caliber    Vertebral arteries: Dominant left vertebral artery with diminutive right vertebral artery    Middle cerebral arteries: The left M3 segment is mildly dominant over the right    Basilar artery: Normal in size and caliber    Posterior cerebral arteries: Symmetric in size and caliber    Posterior communicating arteries: Not identified with certainty  Impression: 1. Mild dominant left M3 segment compared to the right  2. Dominant left vertebral artery with diminutive right vertebral artery  3. Mild localized plaque formation at the carotid bulbs bilaterally and at the cavernous portions of the internal carotid arteries bilaterally with stenosis estimated at less than 30%  4. Nonvisualization of the posterior communicating arteries  bilaterally.    Electronically signed by: Nito Hughes  Date:    08/19/2024  Time:    14:19  CT Head Without Contrast  Narrative: EXAMINATION:  CT HEAD WITHOUT CONTRAST    CLINICAL HISTORY:  Neuro deficit, acute, stroke suspected;, .    TECHNIQUE:  PATIENT RADIATION DOSE: DLP(mGycm) 1118    As per PQRS measures, all CT scans at this facility used dose modulation, iterative reconstruction, and/or weight based dose adjustment when appropriate to reduce radiation dose to as low as reasonably achievable.    COMPARISON:  07/07/2019    FINDINGS:  Serial axial images were obtained of the head without the administration of IV contrast. Both brain and bone parenchymal windows were obtained.  Additional coronal and sagittal reconstructions were obtained.  Ventricles, cisterns, and sulci are prominent in size.  There is no evidence of intracranial hemorrhage, midline shift, mass effect, or abnormal extra-axial fluid collections.  Parenchymal defect is evident at the right the cerebellum, posterior right parietal lobe, and right frontal lobe compatible with old infarct/encephalomalacia.  Scattered hypodensities are seen within the paratracheal white matter suspicious for small vessel ischemic changes.  Minimal intracranial atherosclerosis is identified.  Cerebellar tonsils extend caudally to the level of foramen magnum.  There is beam hardening artifact at the skull base.  There is scattered mucosal thickening at the paranasal sinuses.  Small air-fluid levels noted to the right maxillary sinus.  Mastoid air cells are aerated bilaterally.  Filling defects are evident at the external auditory canals bilaterally suggesting cerumen.  Clinical correlation is indicated.  Bony structures are osteopenic.  Impression: 1. No acute intracranial abnormality identified  2. Generalized cerebral and cerebellar atrophy  3. Parenchymal defect/old infarct/encephalomalacia at the right cerebellum, posterior right parietal lobe, and right frontal  lobe  4. Findings and other details as above    Electronically signed by: Nito Hughes  Date:    08/19/2024  Time:    13:15    Assessment & Plan     Acute left sided CVA with dysarthria and right facial droop  - ECHO and carotid US pending  - neurology consult  - PT/OT/ST  - NPO for now  - continue ASA, plavix and statin    VTE Prophylaxis: Hansa FONSECA, Samia Nicole, [facility] have discussed this patients case with Dr. Claros who agrees with the diagnosis and treatment plan.      _______________________________________________________________  I, Dr. Gregor Claros assumed care of this patient.  For the patient encounter, I performed the substantive portion of the visit, I reviewed the NPPA documentation, treatment plan, and medical decision making.  I had face to face time with this patient.  I have personally reviewed the labs and test results that are presently available. I have reviewed or attempted to review medical records based upon their availability. If patient was admitted under observational status it is with my approval.      Pleasant 83-year-old male presented to outside ER with dysarthria and facial droop outside of the window for any intervention and no large vessel occlusion was noted on CTA head and neck.  MRI does show he has small foci of acute ischemia at the left temporal lobe/insula measuring 1 cm in diameter each.  Currently on exam he does appear to have a right facial droop but no appreciable weakness in upper lower extremities.  Continue stroke protocol and neurology consultation.    Time seen: 3AM 8/20  Critical care time: 35 min; Critical care diagnosis:acute CVA  Gregor Claros MD

## 2024-08-20 NOTE — PT/OT/SLP PROGRESS
Occupational Therapy      Patient Name:  Williams Palacios   MRN:  [id number]    Attempted to see patient for OT evaluation x 2 but unable. On first attempt, patient sitting up in bed, eating lunch. Upon second attempt, patient visibly emotional, daughter at bedside talking with and comforting patient. Will follow-up as able.     8/20/2024

## 2024-08-20 NOTE — NURSING
Nurses Note -- 4 Eyes      8/20/2024   1:06 AM      Skin assessed during: Daily Assessment      [x] No Altered Skin Integrity Present    []Prevention Measures Documented      [] Yes- Altered Skin Integrity Present or Discovered   [] LDA Added if Not in Epic (Describe Wound)   [] New Altered Skin Integrity was Present on Admit and Documented in LDA   [] Wound Image Taken    Wound Care Consulted? No    Attending Nurse:  Bhupendra Bishop RN/Staff Member:   Meenakshi

## 2024-08-20 NOTE — PT/OT/SLP EVAL
Physical Therapy Evaluation and Discharge Note    Patient Name:  Williams Palacios   MRN:  [id number]    Recommendations:     Discharge therapy intensity: No Therapy Indicated (possibly speech therapy?)   Discharge Equipment Recommendations: none   Barriers to discharge: None    Assessment:     Williams Palacios is a 83 y.o. male admitted with a medical diagnosis of L temporal CVA, R facial droop.  At this time, patient is functioning at their prior level of function and does not require further acute PT services. Needed lots of coaxing to participate in PT eval as he was stuck on wanting food and SLP eval. Notified speech. Pt noted to be drooling throughout eval. BLE and BUE strength intact. Mobility is at baseline.     Recent Surgery: * No surgery found *      Plan:     During this hospitalization, patient does not require further acute PT services.  Please re-consult if situation changes.      Subjective     Chief Complaint: wanting to eat food  Patient/Family Comments/goals: to eat food  Pain/Comfort:  Pain Rating 1: 0/10    Patients cultural, spiritual, Rastafari conflicts given the current situation: no    Living Environment:  Lives with wife, no steps to enter.   Prior to admission, patients level of function was independent with mobility and ADLs, uses a rollator or cane as needed, fishes. No hx of falls. Equipment used at home: rollator, cane, straight, bedside commode, shower chair.    Upon discharge, patient will have assistance from family.    Objective:     Communicated with nurse prior to session.  Patient found supine with   upon PT entry to room.    General Precautions: Standard,      Orthopedic Precautions:    Braces: N/A  Respiratory Status: Room air  Blood Pressure:     Exams:  Gross Motor Coordination:  WFL  Sensation:    -       Intact  RLE ROM: WNL  RLE Strength: WNL  LLE ROM: WNL  LLE Strength: WNL    Functional Mobility:  Bed Mobility:     Supine to Sit: independence  Sit to Supine:  independence  Transfers:     Sit to Stand:  modified independence with rolling walker  Gait: 120ft with RW, mod I, no LOB.     AM-PAC 6 CLICK MOBILITY  Total Score:24       Treatment and Education:    Patient, spouse were, and daughter/s were provided with verbal education education regarding PT role/goals/POC, fall prevention, and safety awareness.  Understanding was verbalized.     Patient left supine with all lines intact and call button in reach.    GOALS:   Multidisciplinary Problems       Physical Therapy Goals       Not on file                    History:     Past Medical History:   Diagnosis Date    Coronary stent patent     Heart attack     High cholesterol     Prostate CA        Past Surgical History:   Procedure Laterality Date    BACK SURGERY      CORONARY STENT PLACEMENT      CYSTOSCOPY W/ RETROGRADES Bilateral 6/20/2023    Procedure: CYSTOSCOPY, WITH RETROGRADE PYELOGRAM;  Surgeon: Jonathan Houser MD;  Location: [facility];  Service: Urology;  Laterality: Bilateral;    CYSTOSCOPY WITH CALCULUS EXTRACTION N/A 6/20/2023    Procedure: CYSTOSCOPY, WITH CALCULUS REMOVAL;  Surgeon: Jonathan Houser MD;  Location: [facility];  Service: Urology;  Laterality: N/A;  URETHRAL STRICTURE, URETHRAL CALCULUS    INTERNAL URETHROTOMY N/A 6/20/2023    Procedure: URETHROTOMY, INTERNAL;  Surgeon: Jonathan Houser MD;  Location: [facility];  Service: Urology;  Laterality: N/A;    PHACOEMULSIFICATION, CATARACT, WITH IOL INSERTION Right 10/31/2023    Procedure: PHACOEMULSIFICATION, CATARACT, WITH IOL INSERTION- OD;  Surgeon: Gilbert Hensley MD;  Location: [facility];  Service: Ophthalmology;  Laterality: Right;    PHACOEMULSIFICATION, CATARACT, WITH IOL INSERTION Left 11/28/2023    Procedure: PHACOEMULSIFICATION, CATARACT, WITH IOL INSERTION- OS;  Surgeon: Gilbert Hensley MD;  Location: [facility];  Service: Ophthalmology;  Laterality: Left;       Time Tracking:     PT Received On: 08/20/24  PT Start Time: 1323     PT Stop Time:  1336  PT Total Time (min): 13 min     Billable Minutes: Evaluation 13      08/20/2024

## 2024-08-20 NOTE — HPI
Williams Palacios is a 83 y.o. male with past med history of CAD, dyslipidemia, prostate cancer, prior stroke with no residual deficits.  Patient initially presented 8/19/2024 at 10am to the ER in [place] with complaint of speech disturbance.  Patient reports he recalls driving himself to [place] on 8/17 and feeling well. He recalls awakening feeling fine, then later in morning he and his wife felt speech was not clear and wife also noted right facial droop. Unknown last normal.  No upper or lower extremity weakness or numbness.  No vision changes.  No headache.  Right facial droop reported on [facility] exam.  CT head no acute abnormalities identified.  Generalized cerebral and cerebellar atrophy seen.  MRI revealed at least 2 small foci of acute ischemia on the left temporal lobe/insula measuring 1 diameter each.  Old infarct/encephalomalacia again evident at the right frontal, right parietal lobes in the right cerebellum. Vascular neurology consulted for acute stroke.   70 pack year 1 pack a day current smoker, not sure if will quit, may consider. No alcohol. No regular exercise, occasionally goes fishing.

## 2024-08-20 NOTE — PLAN OF CARE
08/20/24 1003   Discharge Assessment   Assessment Type Discharge Planning Assessment   Confirmed/corrected address, phone number and insurance Yes   Confirmed Demographics Correct on Facesheet   Source of Information patient   Communicated JAILYN with patient/caregiver Date not available/Unable to determine   Reason For Admission CVA   People in Home significant other   Do you expect to return to your current living situation? Other (see comments)  (to be determined)   Prior to hospitilization cognitive status: Unable to Assess   Current cognitive status: Alert/Oriented   Walking or Climbing Stairs Difficulty yes   Walking or Climbing Stairs ambulation difficulty, requires equipment   Mobility Management Rollator   Dressing/Bathing Difficulty yes   Dressing/Bathing bathing difficulty, requires equipment   Dressing/Bathing Management Shower chair   Equipment Currently Used at Home bedside commode;cane, straight;rollator;shower chair   Patient currently being followed by outpatient case management? No   Do you currently have service(s) that help you manage your care at home? No   Do you take prescription medications? Yes   Do you have prescription coverage? Yes   Do you have any problems affording any of your prescribed medications? No   Is the patient taking medications as prescribed? yes   How do you get to doctors appointments? car, drives self   Are you on dialysis? No   Do you take coumadin? No   Discharge Plan A Other  (to be determined)   Discharge Plan B Other  (to be determined)   Discharge Plan discussed with: Patient   Transition of Care Barriers None   Physical Activity   On average, how many days per week do you engage in moderate to strenuous exercise (like a brisk walk)? 0 days   On average, how many minutes do you engage in exercise at this level? 0 min   Financial Resource Strain   How hard is it for you to pay for the very basics like food, housing, medical care, and heating? Somewhat   Housing  Stability   In the last 12 months, was there a time when you were not able to pay the mortgage or rent on time? N   At any time in the past 12 months, were you homeless or living in a shelter (including now)? N   Transportation Needs   Has the lack of transportation kept you from medical appointments, meetings, work or from getting things needed for daily living? No   Food Insecurity   Within the past 12 months, you worried that your food would run out before you got the money to buy more. Never true   Within the past 12 months, the food you bought just didn't last and you didn't have money to get more. Never true   Stress   Do you feel stress - tense, restless, nervous, or anxious, or unable to sleep at night because your mind is troubled all the time - these days? Only a littl   Social Isolation   How often do you feel lonely or isolated from those around you?  Rarely   Alcohol Use   Q1: How often do you have a drink containing alcohol? Never   Q2: How many drinks containing alcohol do you have on a typical day when you are drinking? None   Q3: How often do you have six or more drinks on one occasion? Never   Utilities   In the past 12 months has the electric, gas, oil, or water company threatened to shut off services in your home? No   OTHER   Name(s) of People in Home SO [place] Diaz

## 2024-08-20 NOTE — NURSING
Nurses Note -- 4 Eyes      8/20/2024   5:45 PM      Skin assessed during: Q Shift Change      [x] No Altered Skin Integrity Present    [x]Prevention Measures Documented      [] Yes- Altered Skin Integrity Present or Discovered   [] LDA Added if Not in Epic (Describe Wound)   [] New Altered Skin Integrity was Present on Admit and Documented in LDA   [] Wound Image Taken    Wound Care Consulted? No    Attending Nurse:  Gregor Bishop RN/Staff Member:   Bhupendra

## 2024-08-20 NOTE — ASSESSMENT & PLAN NOTE
- presented with right facial droop and speech disturbance. MRI revealed acute ischemia left temporal lobe/insula.   - Stroke RF: HTN, CAD, prior right sided stroke, no residual deficits  - Intervention: None, out of window, unknown last known normal   - Etiology: TBD    Stroke workup:  -CTh: CTh   Impression:  1. No acute intracranial abnormality identified  2. Generalized cerebral and cerebellar atrophy  3. Parenchymal defect/old infarct/encephalomalacia at the right cerebellum, posterior right parietal lobe, and right frontal lobe  -CTA h/n: Impression:  1. Mild dominant left M3 segment compared to the right  2. Dominant left vertebral artery with diminutive right vertebral artery  3. Mild localized plaque formation at the carotid bulbs bilaterally and at the cavernous portions of the internal carotid arteries bilaterally with stenosis estimated at less than 30%  4. Nonvisualization of the posterior communicating arteries bilaterally.  -MRI brain: Impression:  1. Findings compatible with the at least 2 small foci of acute ischemia at the left temporal lobe/insula measuring 1 cm in diameter each  2. Generalized cerebral and cerebellar atrophy  3. Old infarct/encephalomalacia again evident at the right frontal lobe, right parietal lobe, and right cerebellum  -ECHO: ordered   -CUS: ordered  -LDL: 54   -A1c: 5.1   -TSH: Normal 1.984   -home medications include: Plavix 75, ASA 81, Lipitor 40 mg      Plan:  - continue stroke workup  - Continue Aspirin 81mg daily  - Continue  Atorvastatin 40mg daily  - PT/OT/ST to evaluate

## 2024-08-21 VITALS
OXYGEN SATURATION: 95 % | RESPIRATION RATE: 20 BRPM | SYSTOLIC BLOOD PRESSURE: 122 MMHG | DIASTOLIC BLOOD PRESSURE: 72 MMHG | HEART RATE: 122 BPM | TEMPERATURE: 98 F

## 2024-08-21 LAB
ALBUMIN SERPL-MCNC: 3.5 G/DL (ref 3.4–4.8)
ALBUMIN/GLOB SERPL: 0.9 RATIO (ref 1.1–2)
ALP SERPL-CCNC: 100 UNIT/L (ref 40–150)
ALT SERPL-CCNC: 10 UNIT/L (ref 0–55)
ANION GAP SERPL CALC-SCNC: 11 MEQ/L
APICAL FOUR CHAMBER EJECTION FRACTION: 50 %
APICAL TWO CHAMBER EJECTION FRACTION: 62 %
AST SERPL-CCNC: 19 UNIT/L (ref 5–34)
AV INDEX (PROSTH): 0.56
AV MEAN GRADIENT: 5 MMHG
AV PEAK GRADIENT: 9 MMHG
AV VALVE AREA BY VELOCITY RATIO: 1.73 CM²
AV VALVE AREA: 1.75 CM²
AV VELOCITY RATIO: 0.55
BILIRUB SERPL-MCNC: 0.7 MG/DL
BSA FOR ECHO PROCEDURE: 1.78 M2
BUN SERPL-MCNC: 15.8 MG/DL (ref 8.4–25.7)
CALCIUM SERPL-MCNC: 9 MG/DL (ref 8.8–10)
CHLORIDE SERPL-SCNC: 105 MMOL/L (ref 98–107)
CO2 SERPL-SCNC: 22 MMOL/L (ref 23–31)
CREAT SERPL-MCNC: 0.88 MG/DL (ref 0.73–1.18)
CREAT/UREA NIT SERPL: 18
CV ECHO LV RWT: 0.42 CM
DOP CALC AO PEAK VEL: 1.54 M/S
DOP CALC AO VTI: 27.5 CM
DOP CALC LVOT AREA: 3.1 CM2
DOP CALC LVOT DIAMETER: 2 CM
DOP CALC LVOT PEAK VEL: 0.85 M/S
DOP CALC LVOT STROKE VOLUME: 48.04 CM3
DOP CALC MV VTI: 23.2 CM
DOP CALCLVOT PEAK VEL VTI: 15.3 CM
E WAVE DECELERATION TIME: 254 MSEC
E/A RATIO: 0.76
E/E' RATIO: 6.75 M/S
ECHO LV POSTERIOR WALL: 0.9 CM (ref 0.6–1.1)
ERYTHROCYTE [DISTWIDTH] IN BLOOD BY AUTOMATED COUNT: 13.6 % (ref 11.5–17)
FRACTIONAL SHORTENING: 21 % (ref 28–44)
GFR SERPLBLD CREATININE-BSD FMLA CKD-EPI: >60 ML/MIN/1.73/M2
GLOBULIN SER-MCNC: 3.7 GM/DL (ref 2.4–3.5)
GLUCOSE SERPL-MCNC: 82 MG/DL (ref 82–115)
HCT VFR BLD AUTO: 31.8 % (ref 42–52)
HGB BLD-MCNC: 10.9 G/DL (ref 14–18)
HR MV ECHO: 61 BPM
INTERVENTRICULAR SEPTUM: 1.1 CM (ref 0.6–1.1)
LEFT ATRIUM AREA SYSTOLIC (APICAL 4 CHAMBER): 15.8 CM2
LEFT ATRIUM SIZE: 2.8 CM
LEFT CCA DIST DIAS: 13 CM/S
LEFT CCA DIST SYS: 94 CM/S
LEFT CCA PROX DIAS: 14 CM/S
LEFT CCA PROX SYS: 104 CM/S
LEFT ECA DIAS: 0 CM/S
LEFT ECA SYS: 119 CM/S
LEFT ICA DIST DIAS: 14 CM/S
LEFT ICA DIST SYS: 60 CM/S
LEFT ICA MID DIAS: 13 CM/S
LEFT ICA MID SYS: 64 CM/S
LEFT ICA PROX DIAS: 6 CM/S
LEFT ICA PROX SYS: 70 CM/S
LEFT INTERNAL DIMENSION IN SYSTOLE: 3.4 CM (ref 2.1–4)
LEFT VENTRICLE DIASTOLIC VOLUME INDEX: 45.66 ML/M2
LEFT VENTRICLE DIASTOLIC VOLUME: 83.1 ML
LEFT VENTRICLE END DIASTOLIC VOLUME APICAL 2 CHAMBER: 67.9 ML
LEFT VENTRICLE END DIASTOLIC VOLUME APICAL 4 CHAMBER: 103 ML
LEFT VENTRICLE END SYSTOLIC VOLUME APICAL 4 CHAMBER: 31.2 ML
LEFT VENTRICLE MASS INDEX: 78 G/M2
LEFT VENTRICLE SYSTOLIC VOLUME INDEX: 26 ML/M2
LEFT VENTRICLE SYSTOLIC VOLUME: 47.4 ML
LEFT VENTRICULAR INTERNAL DIMENSION IN DIASTOLE: 4.3 CM (ref 3.5–6)
LEFT VENTRICULAR MASS: 142.49 G
LEFT VERTEBRAL DIAS: 14 CM/S
LEFT VERTEBRAL SYS: 65 CM/S
LV LATERAL E/E' RATIO: 6 M/S
LV SEPTAL E/E' RATIO: 7.71 M/S
LVED V (TEICH): 83.1 ML
LVES V (TEICH): 47.4 ML
LVOT MG: 2 MMHG
LVOT MV: 0.54 CM/S
MAGNESIUM SERPL-MCNC: 2.3 MG/DL (ref 1.6–2.6)
MCH RBC QN AUTO: 34.7 PG (ref 27–31)
MCHC RBC AUTO-ENTMCNC: 34.3 G/DL (ref 33–36)
MCV RBC AUTO: 101.3 FL (ref 80–94)
MV MEAN GRADIENT: 1 MMHG
MV PEAK A VEL: 0.71 M/S
MV PEAK E VEL: 0.54 M/S
MV PEAK GRADIENT: 3 MMHG
MV STENOSIS PRESSURE HALF TIME: 77 MS
MV VALVE AREA BY CONTINUITY EQUATION: 2.07 CM2
MV VALVE AREA P 1/2 METHOD: 2.86 CM2
NRBC BLD AUTO-RTO: 0 %
OHS CV CAROTID RIGHT ICA EDV HIGHEST: 11
OHS CV CAROTID ULTRASOUND LEFT ICA/CCA RATIO: 0.74
OHS CV CAROTID ULTRASOUND RIGHT ICA/CCA RATIO: 0.65
OHS CV PV CAROTID LEFT HIGHEST CCA: 104
OHS CV PV CAROTID LEFT HIGHEST ICA: 70
OHS CV PV CAROTID RIGHT HIGHEST CCA: 142
OHS CV PV CAROTID RIGHT HIGHEST ICA: 71
OHS CV US CAROTID LEFT HIGHEST EDV: 14
OHS LV EJECTION FRACTION SIMPSONS BIPLANE MOD: 56 %
PISA TR MAX VEL: 2.6 M/S
PLATELET # BLD AUTO: 199 X10(3)/MCL (ref 130–400)
PMV BLD AUTO: 9.8 FL (ref 7.4–10.4)
POTASSIUM SERPL-SCNC: 4.3 MMOL/L (ref 3.5–5.1)
PROT SERPL-MCNC: 7.2 GM/DL (ref 5.8–7.6)
RA PRESSURE ESTIMATED: 3 MMHG
RBC # BLD AUTO: 3.14 X10(6)/MCL (ref 4.7–6.1)
RIGHT CCA DIST DIAS: 12 CM/S
RIGHT CCA DIST SYS: 110 CM/S
RIGHT CCA PROX DIAS: 13 CM/S
RIGHT CCA PROX SYS: 142 CM/S
RIGHT ECA DIAS: 0 CM/S
RIGHT ECA SYS: 68 CM/S
RIGHT ICA DIST DIAS: 11 CM/S
RIGHT ICA DIST SYS: 71 CM/S
RIGHT ICA MID DIAS: 8 CM/S
RIGHT ICA MID SYS: 50 CM/S
RIGHT ICA PROX DIAS: 7 CM/S
RIGHT ICA PROX SYS: 29 CM/S
RIGHT VERTEBRAL DIAS: 0 CM/S
RIGHT VERTEBRAL SYS: 52 CM/S
RV TB RVSP: 6 MMHG
SINUS: 4 CM
SODIUM SERPL-SCNC: 138 MMOL/L (ref 136–145)
TDI LATERAL: 0.09 M/S
TDI SEPTAL: 0.07 M/S
TDI: 0.08 M/S
TR MAX PG: 27 MMHG
TRICUSPID ANNULAR PLANE SYSTOLIC EXCURSION: 1.74 CM
TV REST PULMONARY ARTERY PRESSURE: 30 MMHG
WBC # BLD AUTO: 5.98 X10(3)/MCL (ref 4.5–11.5)
Z-SCORE OF LEFT VENTRICULAR DIMENSION IN END DIASTOLE: -1.59
Z-SCORE OF LEFT VENTRICULAR DIMENSION IN END SYSTOLE: 0.7

## 2024-08-21 PROCEDURE — 25000242 PHARM REV CODE 250 ALT 637 W/ HCPCS: Performed by: INTERNAL MEDICINE

## 2024-08-21 PROCEDURE — 80053 COMPREHEN METABOLIC PANEL: CPT | Performed by: INTERNAL MEDICINE

## 2024-08-21 PROCEDURE — 94640 AIRWAY INHALATION TREATMENT: CPT

## 2024-08-21 PROCEDURE — 85027 COMPLETE CBC AUTOMATED: CPT | Performed by: INTERNAL MEDICINE

## 2024-08-21 PROCEDURE — 99900035 HC TECH TIME PER 15 MIN (STAT)

## 2024-08-21 PROCEDURE — 83735 ASSAY OF MAGNESIUM: CPT | Performed by: INTERNAL MEDICINE

## 2024-08-21 PROCEDURE — 94761 N-INVAS EAR/PLS OXIMETRY MLT: CPT

## 2024-08-21 PROCEDURE — 99900031 HC PATIENT EDUCATION (STAT)

## 2024-08-21 PROCEDURE — 97165 OT EVAL LOW COMPLEX 30 MIN: CPT

## 2024-08-21 PROCEDURE — 25000003 PHARM REV CODE 250: Performed by: NURSE PRACTITIONER

## 2024-08-21 RX ADMIN — ASPIRIN 81 MG: 81 TABLET, COATED ORAL at 05:08

## 2024-08-21 RX ADMIN — LEVALBUTEROL HYDROCHLORIDE 0.63 MG: 0.63 SOLUTION RESPIRATORY (INHALATION) at 07:08

## 2024-08-21 RX ADMIN — ATORVASTATIN CALCIUM 40 MG: 40 TABLET, FILM COATED ORAL at 09:08

## 2024-08-21 RX ADMIN — CLOPIDOGREL BISULFATE 75 MG: 75 TABLET ORAL at 05:08

## 2024-08-21 RX ADMIN — EZETIMIBE 10 MG: 10 TABLET ORAL at 09:08

## 2024-08-21 RX ADMIN — LEVALBUTEROL HYDROCHLORIDE 0.63 MG: 0.63 SOLUTION RESPIRATORY (INHALATION) at 02:08

## 2024-08-21 NOTE — PLAN OF CARE
08/21/24 1416   Final Note   Anticipated Discharge Disposition [facility]   Hospital Resources/Appts/Education Provided Post-Acute resouces added to AVS   Post-Acute Status   Post-Acute Authorization Home Health   Home Health Status Set-up Complete/Auth obtained   Patient choice form signed by patient/caregiver List with quality metrics by geographic area provided   Discharge Delays None known at this time     Spoke to patient and family at bedside about home health. [place] of choice obtained. Referral sent to [facility] and spoke to Santhosh. Patient accepted.

## 2024-08-21 NOTE — DISCHARGE SUMMARY
"[facility] Hospital Medicine Discharge Summary    Admit Date: 8/19/2024  Discharge Date and Time: 8/21/202412:16 PM  Admitting Physician:  Team  Discharging Physician: Benjamin Benson MD.  Primary Care Physician: Garcia Ye MD  Consults: Neurology    Discharge Diagnoses:  Acute left sided CVA with dysarthria and right facial droop  Known hx of CAD, dyslipidemia, prostate cancer and previous CVA with no deficits, prostate cancer on enzalutamide daily  COPD with current smoking status     Hospital Course:   83-year-old male whose history includes CAD, dyslipidemia, prostate cancer and previous CVA with no deficits. At the time of my exam patient asleep but easily aroused. Oriented x 3 but somewhat of a poor historian and uncooperative with exam. States he was sleeping good and does not feel like doing anything right now. Initially presented to ER in [place] with c/o speech disturbance. Unclear regarding onset of symptoms. Patient currently reporting he onset was "last week" but ED records show 1000 AM. Denies any unilateral weakness/numbness/tingling, vision changes, headache or confusion. CT head negative showed generalized cerebral and cerebellar atrophy with old infarcts noted in right cerebellum, posterior right parietal lobe and right frontal lobe with no acute intracranial findings. CTA head and neck showed mild localized plaque formation at the carotid bulbs bilaterally and at the cavernous portions of the internal carotid arteries bilaterally with stenosis estimated at less than 30%, nonvisualization of the posterior communicating arteries bilaterally. MRI brain shows findings compatible with at least 2 small foci of acute ischemia of the left temporal lobe/insula measuring 1 cm in diameter each and multiple old infarcts has noted on CT. Transferred here for Neurology Services. Speech somewhat altered and right facial droop noted but unsure if this is acute and otherwise no " focal deficits.   ECHO- prelim report shows EF 56% with diastolic dysfunction.  Normal right ventricular systolic function.  Negative bubble study, right atrium dilated.  Trace mitral regurg, mild tricuspid regurg  Bilateral carotid ultrasound-prelim report shows less than 50% stenosis bilaterally  Neurology on board, appreciate recommendation.  Aspirin Plavix statin, neurology has now signed off  Neurology recommended outpatient referral to or follow up with primary cardiologist for workup requiring to rule out cardioembolic events for recurrent strokes  PT/OT evaluated the patient, no needs, speech evaluated the patient recommended speech therapy to continue.  Patient has difficulty going to outpatient speech, will consult home health with speech therapy  Appropriate home medication for medical conditions has been resumed  Patient is medically cleared for discharge    Pt was seen and examined on the day of discharge  Vitals:  VITAL SIGNS: 24 HRS MIN & MAX LAST   Temp  Min: 97.5 °F (36.4 °C)  Max: 98.6 °F (37 °C) 98.1 °F (36.7 °C)   BP  Min: 120/72  Max: 166/83 120/72   Pulse  Min: 65  Max: 105  87   Resp  Min: 16  Max: 20 16   SpO2  Min: 96 %  Max: 98 % 98 %       Physical Exam:  General: In no acute distress, afebrile  Chest: Coarse breath sounds with occasional wheeze  Heart: RRR, +S1, S2, no appreciable murmur  Abdomen: Soft, nontender, BS +  MSK: Warm, no lower extremity edema, no clubbing or cyanosis  Neurologic: Alert and oriented x4, mild dysarthria and right facial droop.     Recent Labs   Lab 08/19/24  1316 08/20/24  0508 08/21/24  0358   WBC 6.95 6.27 5.98   RBC 3.23* 3.10* 3.14*   HGB 11.0* 10.4* 10.9*   HCT 32.5* 30.9* 31.8*   .6* 99.7* 101.3*   MCH 34.1* 33.5* 34.7*   MCHC 33.8 33.7 34.3   RDW 13.7 13.5 13.6    201 199   MPV 9.9 9.5 9.8       Recent Labs   Lab 08/19/24  1316 08/20/24  0508 08/21/24  0358    136 138   K 4.2 4.1 4.3   * 107 105   CO2 23 22* 22*   BUN 21.0 15.0  15.8   CREATININE 0.99 0.89 0.88   CALCIUM 9.8 9.5 9.0   MG  --   --  2.30   ALBUMIN 3.7 3.5 3.5   ALKPHOS 98 97 100   ALT 12 11 10   AST 18 17 19   BILITOT 0.4 0.6 0.7        Microbiology Results (last 7 days)       ** No results found for the last 168 hours. **             Echo    Left Ventricle: The left ventricle is normal in size. Normal wall   thickness. There is normal systolic function with a visually estimated   ejection fraction of 56%. Grade I diastolic dysfunction.    Right Ventricle: Normal right ventricular cavity size. Systolic   function is normal. TAPSE is 1.74 cm.    Left Atrium: Agitated saline study of the atrial septum is negative,   suggesting absence of intracardiac shunt at the atrial level.    Right Atrium: Right atrium is dilated.    Aortic Valve: There is aortic valve sclerosis.    Mitral Valve: The mitral valve is structurally normal. There is trace   regurgitation.    Tricuspid Valve: There is mild regurgitation.    Pulmonary Artery: The estimated pulmonary artery systolic pressure is   30 mmHg.    IVC/SVC: Normal venous pressure at 3 mmHg.         Medication List        CONTINUE taking these medications      aspirin 81 MG EC tablet  Commonly known as: ECOTRIN     atorvastatin 40 MG tablet  Commonly known as: LIPITOR     clopidogreL 75 mg tablet  Commonly known as: PLAVIX     ezetimibe 10 mg tablet  Commonly known as: ZETIA     lactulose 20 gram/30 mL Soln  Commonly known as: CHRONULAC  Take 30 mLs (20 g total) by mouth 3 (three) times daily as needed (constipation).     megestroL 20 MG Tab  Commonly known as: MEGACE     metoprolol tartrate 50 MG tablet  Commonly known as: LOPRESSOR     ondansetron 4 MG Tbdl  Commonly known as: ZOFRAN-ODT  Take 1 tablet (4 mg total) by mouth every 6 (six) hours as needed (nausea).     XTANDI 80 mg Tab  Generic drug: enzalutamide               Explained in detail to the patient about the discharge plan, medications, and follow-up visits. Pt understands  and agrees with the treatment plan  Discharge Disposition:  Home with home health  Discharged Condition: stable  Diet-   Dietary Orders (From admission, onward)       Start     Ordered    08/20/24 1347  Diet Easy to Chew (IDDSI Level 7)  Diet effective now        Comments: And thin liquids    08/20/24 1351                   Medications Per DC med rec  Activities as tolerated   Follow-up Information       Maeve Lindquist, FNP. Schedule an appointment as soon as possible for a visit in 3 month(s).    Specialty: Neurology  Why: In stroke clinic SPOKE WITH ANABEL; PATIENT WILL CALL FOR A FOLLOW UP APPT WHEN DISCHARGED  Contact information:  [address] [place] [address] [address] Christopher [place] [phone]               Garcia Ye MD. Schedule an appointment as soon as possible for a visit in 1 week(s).    Specialty: Internal Medicine  Why: post discharge, need refrral to cardiology for holter/ LinQ to r/u cardioembolic event for recurrent strokes  Contact information:  [address] [place] [address] B  [employer], [facility] [address] [phone]                           For further questions contact hospitalist office    Discharge time 34 minutes    For worsening symptoms, chest pain, shortness of breath, increased abdominal pain, high grade fever, stroke or stroke like symptoms, immediately go to the nearest Emergency Room or call 911 as soon as possible.    Portions of this note dictated using EMR integrated voice recognition software, and may be subject to voice recognition errors not corrected at proofreading. Please contact writer for clarification if needed    Benjamin Benson MD  Department of Hospital Medicine   [facility]   08/21/2024 12:16 PM

## 2024-08-21 NOTE — ASSESSMENT & PLAN NOTE
- presented with right facial droop and speech disturbance. MRI revealed acute ischemia left temporal lobe/insula.   - Stroke RF: HTN, CAD, prior right sided stroke, no residual deficits  - Intervention: None, out of window, unknown last known normal   - Etiology: likely cardio embolic    Stroke workup:  -CTh: CTh   Impression:  1. No acute intracranial abnormality identified  2. Generalized cerebral and cerebellar atrophy  3. Parenchymal defect/old infarct/encephalomalacia at the right cerebellum, posterior right parietal lobe, and right frontal lobe  -CTA h/n: Impression:  1. Mild dominant left M3 segment compared to the right  2. Dominant left vertebral artery with diminutive right vertebral artery  3. Mild localized plaque formation at the carotid bulbs bilaterally and at the cavernous portions of the internal carotid arteries bilaterally with stenosis estimated at less than 30%  4. Nonvisualization of the posterior communicating arteries bilaterally.  -MRI brain: Impression:  1. Findings compatible with the at least 2 small foci of acute ischemia at the left temporal lobe/insula measuring 1 cm in diameter each  2. Generalized cerebral and cerebellar atrophy  3. Old infarct/encephalomalacia again evident at the right frontal lobe, right parietal lobe, and right cerebellum  -ECHO: EF 56%. Agitated saline study of the atrial septum is negative, suggesting absence of intracardiac shunt at the atrial level.   -CUS: The right internal carotid artery was patent with less than 50% stenosis.   The left internal carotid artery was patent with less than 50% stenosis. Bilateral vertebral arteries were patent with antegrade flow.  -LDL: 54   -A1c: 5.1   -TSH: Normal 1.984   -home medications include: Plavix 75, ASA 81, Lipitor 40 mg      Plan:  - Continue Aspirin 81mg daily  - Continue  Atorvastatin 40mg daily  - PT/OT/ST follow recommendations  - follow-up out patient with cardiology stroke likely cardio embolic recommend  long-term cardiac monitor further evaluate   - with regard to home med Plavix follow cardiac recommendations  - stop smoking   - follow up 3 months with out patient stroke clinic, Maeve Lindquist NP   - Neurology signing off. Please call if there are any further questions or concerns.

## 2024-08-21 NOTE — ASSESSMENT & PLAN NOTE
- presented with right facial droop and speech disturbance. MRI revealed acute ischemia left temporal lobe/insula.   - Stroke RF: HTN, CAD, prior right sided stroke, no residual deficits  - Intervention: None, out of window, unknown last known normal   - Etiology: likely cardio embolic    Stroke workup:  -CTh: CTh   Impression:  1. No acute intracranial abnormality identified  2. Generalized cerebral and cerebellar atrophy  3. Parenchymal defect/old infarct/encephalomalacia at the right cerebellum, posterior right parietal lobe, and right frontal lobe  -CTA h/n: Impression:  1. Mild dominant left M3 segment compared to the right  2. Dominant left vertebral artery with diminutive right vertebral artery  3. Mild localized plaque formation at the carotid bulbs bilaterally and at the cavernous portions of the internal carotid arteries bilaterally with stenosis estimated at less than 30%  4. Nonvisualization of the posterior communicating arteries bilaterally.  -MRI brain: Impression:  1. Findings compatible with the at least 2 small foci of acute ischemia at the left temporal lobe/insula measuring 1 cm in diameter each  2. Generalized cerebral and cerebellar atrophy  3. Old infarct/encephalomalacia again evident at the right frontal lobe, right parietal lobe, and right cerebellum  -ECHO: EF 56%. Agitated saline study of the atrial septum is negative, suggesting absence of intracardiac shunt at the atrial level.   -CUS: The right internal carotid artery was patent with less than 50% stenosis.   The left internal carotid artery was patent with less than 50% stenosis. Bilateral vertebral arteries were patent with antegrade flow.  -LDL: 54   -A1c: 5.1   -TSH: Normal 1.984   -home medications include: Plavix 75, ASA 81, Lipitor 40 mg      Plan:  - Continue Aspirin 81mg daily  - Continue  Atorvastatin 40mg daily  - follow-up out patient with cardiology stroke likely cardio embolic recommend long-term cardiac monitor further  evaluate   - with regard to home med Plavix follow cardiac recommendations  - stop smoking   - No further stroke specific recommendations at this time. Neurology signing off  ... Please call if there are any further questions or concerns.     - PT/OT/ST to evaluate

## 2024-08-21 NOTE — PROGRESS NOTES
[facility] - 4th Floor Medical Telemetry  Neurology  Progress Note    Patient Name: Williams Palacios  MRN: [id number]  Admission Date: 8/19/2024  Hospital Length of Stay: 2 days  Code Status: Full Code   Attending Provider: Gregor Claros MD  Primary Care Physician: Garcia Ye MD   Principal Problem:<principal problem not specified>    HPI:   Williams Palacios is a 83 y.o. male with past med history of CAD, dyslipidemia, prostate cancer, prior stroke with no residual deficits.  Patient initially presented 8/19/2024 at 10am to the ER in [place] with complaint of speech disturbance.  Patient reports he recalls driving himself to [place] on 8/17 and feeling well. He recalls awakening feeling fine, then later in morning he and his wife felt speech was not clear and wife also noted right facial droop. Unknown last normal.  No upper or lower extremity weakness or numbness.  No vision changes.  No headache.  Right facial droop reported on [facility] exam.  CT head no acute abnormalities identified.  Generalized cerebral and cerebellar atrophy seen.  MRI revealed at least 2 small foci of acute ischemia on the left temporal lobe/insula measuring 1 diameter each.  Old infarct/encephalomalacia again evident at the right frontal, right parietal lobes in the right cerebellum. Vascular neurology consulted for acute stroke.   70 pack year 1 pack a day current smoker, not sure if will quit, may consider. No alcohol. No regular exercise, occasionally goes fishing.         Subjective:     Interval History: Slept well last night. No weakness. No dizziness. No speech difficulty. No slurred speech. Right facial droop remains.       Current Facility-Administered Medications   Medication Dose Route Frequency Provider Last Rate Last Admin    acetaminophen tablet 650 mg  650 mg Oral Q6H PRN Samia Nicole FNP        aspirin EC tablet 81 mg  81 mg Oral QAM Samia Nicole FNP   81 mg at 08/21/24 0556    atorvastatin  tablet 40 mg  40 mg Oral Daily Samia Nicole, FNP   40 mg at 08/20/24 0915    bisacodyL suppository 10 mg  10 mg Rectal Daily PRN Samia Nicole, FNP        clopidogreL tablet 75 mg  75 mg Oral QAM Samia Nicole, FNP   75 mg at 08/21/24 0556    enoxaparin injection 40 mg  40 mg Subcutaneous Daily Samia Nicole, FNP   40 mg at 08/20/24 1609    enzalutamide Tab 160 mg  160 mg Oral QHS Gregor Claros MD        ezetimibe tablet 10 mg  10 mg Oral Daily Samia Nicole, FNP   10 mg at 08/20/24 0915    hydrALAZINE injection 10 mg  10 mg Intravenous Q6H PRN Samia Nicole, FNP        labetaloL injection 10 mg  10 mg Intravenous Q2H PRN Samia Nicole, FNP        levalbuterol nebulizer solution 0.63 mg  0.63 mg Nebulization Q6H WAKE Benjamin Benson MD   0.63 mg at 08/21/24 0731    ondansetron injection 4 mg  4 mg Intravenous Q4H PRN Samia Nicole, FNP        prochlorperazine injection Soln 5 mg  5 mg Intravenous Q6H PRN Samia Nicole, FNP        sodium chloride 0.9% flush 10 mL  10 mL Intravenous PRN Samia Nicole, FNP           Review of Systems   Constitutional:  Negative for appetite change and fatigue.   Neurological:  Positive for facial asymmetry. Negative for dizziness, tremors, seizures, syncope, speech difficulty, weakness, light-headedness, numbness and headaches.     Objective:     Vital Signs (Most Recent):  Temp: 98.1 °F (36.7 °C) (08/21/24 0812)  Pulse: 87 (08/21/24 0812)  Resp: 16 (08/21/24 0812)  BP: 120/72 (08/21/24 0812)  SpO2: 98 % (08/21/24 0812) Vital Signs (24h Range):  Temp:  [97.5 °F (36.4 °C)-98.6 °F (37 °C)] 98.1 °F (36.7 °C)  Pulse:  [] 87  Resp:  [16-20] 16  SpO2:  [96 %-98 %] 98 %  BP: (120-166)/(64-85) 120/72        There is no height or weight on file to calculate BMI.     Physical Exam  Vitals and nursing note reviewed.   Constitutional:       General: He is not in acute distress.     Appearance: Normal appearance. He is well-developed. He is not  ill-appearing or toxic-appearing.   HENT:      Head: Normocephalic and atraumatic.      Right Ear: External ear normal.      Left Ear: External ear normal.      Nose: Nose normal.      Mouth/Throat:      Mouth: Mucous membranes are moist.      Pharynx: Oropharynx is clear. No oropharyngeal exudate.   Eyes:      Extraocular Movements: Extraocular movements intact and EOM normal.      Conjunctiva/sclera: Conjunctivae normal.      Pupils: Pupils are equal, round, and reactive to light.   Cardiovascular:      Rate and Rhythm: Normal rate.   Pulmonary:      Effort: Pulmonary effort is normal.   Abdominal:      General: Abdomen is flat.      Palpations: Abdomen is soft.   Musculoskeletal:      Cervical back: Normal range of motion and neck supple.   Skin:     General: Skin is warm and dry.   Neurological:      Mental Status: He is alert and oriented to person, place, and time.      Coordination: Finger-Nose-Finger Test and Heel to Shin Test normal.      Deep Tendon Reflexes:      Reflex Scores:       Bicep reflexes are 2+ on the right side and 2+ on the left side.       Brachioradialis reflexes are 2+ on the right side and 2+ on the left side.  Psychiatric:         Mood and Affect: Mood normal.         Speech: Speech normal.         Behavior: Behavior normal. Behavior is cooperative.         Thought Content: Thought content normal.         Judgment: Judgment normal.          NEUROLOGICAL EXAMINATION:     MENTAL STATUS   Oriented to person, place, and time.   Registration: recalls 3 of 3 objects.   Attention: normal. Concentration: normal.   Speech: speech is normal   Level of consciousness: alert  Knowledge: good.   Able to name object. Normal comprehension.     CRANIAL NERVES     CN II   Visual fields full to confrontation.     CN III, IV, VI   Pupils are equal, round, and reactive to light.  Extraocular motions are normal.     CN V   Facial sensation intact.     CN VII   Facial expression full, symmetric.     CN XII    Tongue: not atrophic  Tongue deviation: none    MOTOR EXAM   Muscle bulk: normal    Strength   Right biceps: 5/5  Left biceps: 5/5  Right triceps: 5/5  Left triceps: 5/5  Right quadriceps: 5/5  Left quadriceps: 5/5  Right hamstrin/5  Left hamstrin/5    REFLEXES     Reflexes   Right brachioradialis: 2+  Left brachioradialis: 2+  Right biceps: 2+  Left biceps: 2+    SENSORY EXAM   Light touch normal.   Vibration normal.     GAIT AND COORDINATION      Coordination   Finger to nose coordination: normal  Heel to shin coordination: normal      Significant Labs:   Recent Lab Results         24  0358   24  1058        Albumin/Globulin Ratio 0.9         A2C EF   62       A4C EF   50       Albumin 3.5                  ALT 10         Anion Gap 11.0         Ao peak ford   1.54       Ao VTI   27.50       AST 19         AV valve area   1.75       CASSANDRA by Velocity Ratio   1.73       AV mean gradient   5       AV index (prosthetic)   0.56       AV peak gradient   9       AV Velocity Ratio   0.55       BILIRUBIN TOTAL 0.7         BSA   1.78       BUN 15.8         BUN/CREAT RATIO 18         Calcium 9.0         Chloride 105         CO2 22         Creatinine 0.88         Left Ventricle Relative Wall Thickness   0.42       E/A ratio   0.76       E/E' ratio   6.75       eGFR >60         E wave deceleration time   254.00       FS   21       Globulin, Total 3.7         Glucose 82         Hematocrit 31.8         Hemoglobin 10.9         Mitral Valve Heart Rate   61       IVSd   1.10       LA area A4C   15.80       LA size   2.80       LVOT area   3.1       LV LATERAL E/E' RATIO   6.00       LV SEPTAL E/E' RATIO   7.71       LV EDV BP   83.10       LV Diastolic Volume Index   45.66       Left Ventricular End Diastolic Volume by Teichholz Method   83.10       LV EDV A2C   [id number]       LV EDV A4C   103.00       Left Ventricular End Systolic Volume by Teichholz Method   47.40       LV ESV A4C   31.20        LVIDd   4.30       LVIDs   3.40       LV mass   142.49       LV Mass Index   78       Left Ventricular Outflow Tract Mean Gradient   2.00       Left Ventricular Outflow Tract Mean Velocity   0.54       LVOT diameter   2.00       LVOT peak herminio   0.85       LVOT stroke volume   48.04       LVOT peak VTI   15.30       LV ESV BP   47.40       LV Systolic Volume Index   26.0       Magnesium  2.30         MCH 34.7         MCHC 34.3         .3         Mean e'   0.08       MPV 9.8         MV valve area p 1/2 method   2.86       MV valve area by continuity eq   2.07       MV mean gradient   1       MV peak gradient   3       MV Peak A Herminio   0.71       MV Peak E Herminio   0.54       MV stenosis pressure 1/2 time   77.00       MV VTI   23.2       nRBC 0.0         Kelly's Biplane MOD Ejection Fraction   56       Platelet Count 199         Potassium 4.3         PROTEIN TOTAL 7.2         Posterior Wall   0.90       Est. RA pres   3       RBC 3.14         RDW 13.6         RV TB RVSP   6       Sinus   4.0       Sodium 138         TAPSE   1.74       TDI SEPTAL   0.07       TDI LATERAL   0.09       Triscuspid Valve Regurgitation Peak Gradient   27       TR Max Herminio   2.6       TV resting pulmonary artery pressure   30       WBC 5.98         ZLVIDD   -1.59       ZLVIDS   0.70               Significant Imaging: I have reviewed all pertinent imaging results/findings within the past 24 hours.  Assessment and Plan:     Stroke  - presented with right facial droop and speech disturbance. MRI revealed acute ischemia left temporal lobe/insula.   - Stroke RF: HTN, CAD, prior right sided stroke, no residual deficits  - Intervention: None, out of window, unknown last known normal   - Etiology: likely cardio embolic    Stroke workup:  -CTh: CTh   Impression:  1. No acute intracranial abnormality identified  2. Generalized cerebral and cerebellar atrophy  3. Parenchymal defect/old infarct/encephalomalacia at the right cerebellum, posterior  right parietal lobe, and right frontal lobe  -CTA h/n: Impression:  1. Mild dominant left M3 segment compared to the right  2. Dominant left vertebral artery with diminutive right vertebral artery  3. Mild localized plaque formation at the carotid bulbs bilaterally and at the cavernous portions of the internal carotid arteries bilaterally with stenosis estimated at less than 30%  4. Nonvisualization of the posterior communicating arteries bilaterally.  -MRI brain: Impression:  1. Findings compatible with the at least 2 small foci of acute ischemia at the left temporal lobe/insula measuring 1 cm in diameter each  2. Generalized cerebral and cerebellar atrophy  3. Old infarct/encephalomalacia again evident at the right frontal lobe, right parietal lobe, and right cerebellum  -ECHO: EF 56%. Agitated saline study of the atrial septum is negative, suggesting absence of intracardiac shunt at the atrial level.   -CUS: The right internal carotid artery was patent with less than 50% stenosis.   The left internal carotid artery was patent with less than 50% stenosis. Bilateral vertebral arteries were patent with antegrade flow.  -LDL: 54   -A1c: 5.1   -TSH: Normal 1.984   -home medications include: Plavix 75, ASA 81, Lipitor 40 mg      Plan:  - Continue Aspirin 81mg daily  - Continue  Atorvastatin 40mg daily  - PT/OT/ST follow recommendations  - follow-up out patient with cardiology stroke likely cardio embolic recommend long-term cardiac monitor further evaluate   - with regard to home med Plavix follow cardiac recommendations  - stop smoking   - follow up 3 months with out patient stroke clinic, Maeve Lindquist NP   - Neurology signing off. Please call if there are any further questions or concerns.         VTE Risk Mitigation (From admission, onward)           Ordered     enoxaparin injection 40 mg  Daily         08/19/24 2346     IP VTE HIGH RISK PATIENT  Once         08/19/24 2346     Place sequential compression device   Until discontinued         08/19/24 5306                  Vascular neurology will sign off. Please contact us if you have any additional questions.      Elvia Pepe NP  Neurology  [facility] - 4th Floor Medical Telemetry

## 2024-08-21 NOTE — NURSING
Nurses Note -- 4 Eyes      8/21/2024   12:41 PM      Skin assessed during: Q Shift Change      [x] No Altered Skin Integrity Present    [x]Prevention Measures Documented      [] Yes- Altered Skin Integrity Present or Discovered   [] LDA Added if Not in Epic (Describe Wound)   [] New Altered Skin Integrity was Present on Admit and Documented in LDA   [] Wound Image Taken    Wound Care Consulted? No    Attending Nurse:  Gregor Bishop RN/Staff Member:   Jojo

## 2024-08-21 NOTE — PLAN OF CARE
Problem: Adult Inpatient Plan of Care  Goal: Plan of Care Review  Outcome: Progressing  Goal: Patient-Specific Goal (Individualized)  Outcome: Progressing  Goal: Absence of Hospital-Acquired Illness or Injury  Outcome: Progressing  Goal: Optimal Comfort and Wellbeing  Outcome: Progressing  Goal: Readiness for Transition of Care  Outcome: Progressing     Problem: Stroke, Ischemic (Includes Transient Ischemic Attack)  Goal: Optimal Coping  Outcome: Progressing  Goal: Effective Bowel Elimination  Outcome: Progressing  Goal: Optimal Cerebral Tissue Perfusion  Outcome: Progressing  Goal: Optimal Cognitive Function  Outcome: Progressing  Goal: Improved Communication Skills  Outcome: Progressing

## 2024-08-21 NOTE — PT/OT/SLP EVAL
Occupational Therapy   Evaluation and Discharge Note    Name: Williams Palacios  MRN: [id number]  Admitting Diagnosis: L temporal CVA, R facial droop  Recent Surgery: * No surgery found *      Recommendations:     Discharge therapy intensity: No Therapy Indicated   Discharge Equipment Recommendations: none  Barriers to discharge:  None    Assessment:     Williams Palacios is a 83 y.o. male with a medical diagnosis of L temporal CVA, R facial droop. . On eval, patient presents at baseline for ADLs. No equipment needs. Uses RW or cane for functional mobility. Skilled OT services are not warranted at this time. Re-consult if needed.    Plan:     OT to sign off as acute OT services are not warranted at this time.  Please re-consult if situation changes during this hospitalization.    Plan of Care Reviewed with: patient    Subjective     Chief Complaint: none stated  Patient/Family Comments/goals: return home    Occupational Profile:  Living Environment: lives with wife in [facility] with no HALEY. Has shower/tub combo with bench and grab bars  Previous level of function:  modified independent with rollator and/or cane; independent with ADLs  Roles and Routines: , father  Equipment Used at Home: walker, rolling, cane, straight  Assistance upon Discharge: will have assistance from wife and children upon dc    Pain/Comfort:  Pain Rating 1: 0/10    Patients cultural, spiritual, Spiritism conflicts given the current situation: no    Objective:     OT communicated with nursing, PT prior to session.      Patient was found HOB elevated with peripheral IV, telemetry upon OT entry to room.    General Precautions: Standard, fall  Orthopedic Precautions: N/A  Braces: N/A    Vital Signs: Respiratory Status: on room air    Bed Mobility:    Patient completed Supine to Sit with independence    Functional Mobility/Transfers:  Patient completed Sit <> Stand Transfer with stand by assistance  with  rolling walker   Patient completed Bed <> Chair  Transfer using Step Transfer technique with stand by assistance with rolling walker  Functional Mobility: Ambulated ~100ft with RW and SBA. No LOB or safety concerns.    Activities of Daily Living:  Feeding:  independence    Lower Body Dressing: independence don socks  Toileting: independence      AMPAC 6 Click ADL:  [facility] Total Score: 23    Functional Cognition:  Orientation: oriented to Person, Place, Time, and Situation    Visual Perceptual Skills:  Intact    Upper Extremity Function:  Right Upper Extremity:   Range of Motion: WFL  Strength: WFL  Sensation: WFL    Left Upper Extremity:  Range of Motion: WFL  Strength: WFL  Sensation: WFL    Balance:   Intact    Therapeutic Positioning  Risk for acquired pressure injuries is decreased due to ability to mobilize independently .    OT interventions performed during the course of today's session in an effort to prevent and/or reduce acquired pressure injuries:   Education was provided on benefits of and recommendations for therapeutic positioning    Skin assessment: all bony prominences were assessed    Findings: no redness or breakdown noted    OT recommendations for therapeutic positioning throughout hospitalization:   Follow [facility] Pressure Injury Prevention Protocol    Patient Education:  Patient provided with verbal education education regarding OT role/goals/POC.  Understanding was verbalized.     Patient left up in chair with call button in reach.    History:     Past Medical History:   Diagnosis Date    Coronary stent patent     Heart attack     High cholesterol     Prostate CA          Past Surgical History:   Procedure Laterality Date    BACK SURGERY      CORONARY STENT PLACEMENT      CYSTOSCOPY W/ RETROGRADES Bilateral 6/20/2023    Procedure: CYSTOSCOPY, WITH RETROGRADE PYELOGRAM;  Surgeon: Jonathan Houser MD;  Location: [facility];  Service: Urology;  Laterality: Bilateral;    CYSTOSCOPY WITH CALCULUS EXTRACTION N/A 6/20/2023    Procedure: CYSTOSCOPY, WITH  CALCULUS REMOVAL;  Surgeon: Jonathan Houser MD;  Location: [facility];  Service: Urology;  Laterality: N/A;  URETHRAL STRICTURE, URETHRAL CALCULUS    INTERNAL URETHROTOMY N/A 6/20/2023    Procedure: URETHROTOMY, INTERNAL;  Surgeon: Jonathan Houser MD;  Location: [facility];  Service: Urology;  Laterality: N/A;    PHACOEMULSIFICATION, CATARACT, WITH IOL INSERTION Right 10/31/2023    Procedure: PHACOEMULSIFICATION, CATARACT, WITH IOL INSERTION- OD;  Surgeon: Gilbert Hensley MD;  Location: [facility];  Service: Ophthalmology;  Laterality: Right;    PHACOEMULSIFICATION, CATARACT, WITH IOL INSERTION Left 11/28/2023    Procedure: PHACOEMULSIFICATION, CATARACT, WITH IOL INSERTION- OS;  Surgeon: Gilbert Hensley MD;  Location: [facility];  Service: Ophthalmology;  Laterality: Left;       Time Tracking:     OT Date of Treatment:    OT Start Time: 0958  OT Stop Time: 1012  OT Total Time (min): 14 min    Billable Minutes:Evaluation low    8/21/2024

## 2024-08-21 NOTE — SUBJECTIVE & OBJECTIVE
Subjective:     Interval History: Slept well last night. No weakness. No dizziness. No speech difficulty. No slurred speech. Right facial droop remains.       Current Facility-Administered Medications   Medication Dose Route Frequency Provider Last Rate Last Admin    acetaminophen tablet 650 mg  650 mg Oral Q6H PRN Samia Nicole, FNP        aspirin EC tablet 81 mg  81 mg Oral Samia Genao, FNP   81 mg at 08/21/24 0556    atorvastatin tablet 40 mg  40 mg Oral Daily Samia Nicole, FNP   40 mg at 08/20/24 0915    bisacodyL suppository 10 mg  10 mg Rectal Daily PRN Samia Nicole, JUANJOP        clopidogreL tablet 75 mg  75 mg Oral Samia Genao FNP   75 mg at 08/21/24 0556    enoxaparin injection 40 mg  40 mg Subcutaneous Daily Samia Nicole, FNP   40 mg at 08/20/24 1609    enzalutamide Tab 160 mg  160 mg Oral QHS Gregor Claros MD        ezetimibe tablet 10 mg  10 mg Oral Daily Samia Nicole, FNP   10 mg at 08/20/24 0915    hydrALAZINE injection 10 mg  10 mg Intravenous Q6H PRN Samia Nicole, TRACI        labetaloL injection 10 mg  10 mg Intravenous Q2H PRN Samia Nicole, TRACI        levalbuterol nebulizer solution 0.63 mg  0.63 mg Nebulization Q6H [facility] Benjamin Benson MD   0.63 mg at 08/21/24 0731    ondansetron injection 4 mg  4 mg Intravenous Q4H PRN Samia Nicole, TRACI        prochlorperazine injection Soln 5 mg  5 mg Intravenous Q6H PRN Samia Nicole, JUANJOP        sodium chloride 0.9% flush 10 mL  10 mL Intravenous PRN Samia Nicole FNP           Review of Systems   Constitutional:  Negative for appetite change and fatigue.   Neurological:  Positive for facial asymmetry. Negative for dizziness, tremors, seizures, syncope, speech difficulty, weakness, light-headedness, numbness and headaches.     Objective:     Vital Signs (Most Recent):  Temp: 98.1 °F (36.7 °C) (08/21/24 0812)  Pulse: 87 (08/21/24 0812)  Resp: 16 (08/21/24 0812)  BP: 120/72 (08/21/24  0812)  SpO2: 98 % (08/21/24 0812) Vital Signs (24h Range):  Temp:  [97.5 °F (36.4 °C)-98.6 °F (37 °C)] 98.1 °F (36.7 °C)  Pulse:  [] 87  Resp:  [16-20] 16  SpO2:  [96 %-98 %] 98 %  BP: (120-166)/(64-85) 120/72        There is no height or weight on file to calculate BMI.     Physical Exam  Vitals and nursing note reviewed.   Constitutional:       General: He is not in acute distress.     Appearance: Normal appearance. He is well-developed. He is not ill-appearing or toxic-appearing.   HENT:      Head: Normocephalic and atraumatic.      Right Ear: External ear normal.      Left Ear: External ear normal.      Nose: Nose normal.      Mouth/Throat:      Mouth: Mucous membranes are moist.      Pharynx: Oropharynx is clear. No oropharyngeal exudate.   Eyes:      Extraocular Movements: Extraocular movements intact and EOM normal.      Conjunctiva/sclera: Conjunctivae normal.      Pupils: Pupils are equal, round, and reactive to light.   Cardiovascular:      Rate and Rhythm: Normal rate.   Pulmonary:      Effort: Pulmonary effort is normal.   Abdominal:      General: Abdomen is flat.      Palpations: Abdomen is soft.   Musculoskeletal:      Cervical back: Normal range of motion and neck supple.   Skin:     General: Skin is warm and dry.   Neurological:      Mental Status: He is alert and oriented to person, place, and time.      Coordination: Finger-Nose-Finger Test and Heel to Shin Test normal.      Deep Tendon Reflexes:      Reflex Scores:       Bicep reflexes are 2+ on the right side and 2+ on the left side.       Brachioradialis reflexes are 2+ on the right side and 2+ on the left side.  Psychiatric:         Mood and Affect: Mood normal.         Speech: Speech normal.         Behavior: Behavior normal. Behavior is cooperative.         Thought Content: Thought content normal.         Judgment: Judgment normal.          NEUROLOGICAL EXAMINATION:     MENTAL STATUS   Oriented to person, place, and time.    Registration: recalls 3 of 3 objects.   Attention: normal. Concentration: normal.   Speech: speech is normal   Level of consciousness: alert  Knowledge: good.   Able to name object. Normal comprehension.     CRANIAL NERVES     CN II   Visual fields full to confrontation.     CN III, IV, VI   Pupils are equal, round, and reactive to light.  Extraocular motions are normal.     CN V   Facial sensation intact.     CN VII   Facial expression full, symmetric.     CN XII   Tongue: not atrophic  Tongue deviation: none    MOTOR EXAM   Muscle bulk: normal    Strength   Right biceps: 5/5  Left biceps: 5/5  Right triceps: 5/5  Left triceps: 5/5  Right quadriceps: 5/5  Left quadriceps: 5/5  Right hamstrin/5  Left hamstrin/5    REFLEXES     Reflexes   Right brachioradialis: 2+  Left brachioradialis: 2+  Right biceps: 2+  Left biceps: 2+    SENSORY EXAM   Light touch normal.   Vibration normal.     GAIT AND COORDINATION      Coordination   Finger to nose coordination: normal  Heel to shin coordination: normal      Significant Labs:   Recent Lab Results         24  0358   24  1058        Albumin/Globulin Ratio 0.9         A2C EF   62       A4C EF   50       Albumin 3.5                  ALT 10         Anion Gap 11.0         Ao peak ford   1.54       Ao VTI   27.50       AST 19         AV valve area   1.75       CASSANDRA by Velocity Ratio   1.73       AV mean gradient   5       AV index (prosthetic)   0.56       AV peak gradient   9       AV Velocity Ratio   0.55       BILIRUBIN TOTAL 0.7         BSA   1.78       BUN 15.8         BUN/CREAT RATIO 18         Calcium 9.0         Chloride 105         CO2 22         Creatinine 0.88         Left Ventricle Relative Wall Thickness   0.42       E/A ratio   0.76       E/E' ratio   6.75       eGFR >60         E wave deceleration time   254.00       FS   21       Globulin, Total 3.7         Glucose 82         Hematocrit 31.8         Hemoglobin 10.9         Mitral Valve  Heart Rate   61       IVSd   1.10       LA area A4C   15.80       LA size   2.80       LVOT area   3.1       LV LATERAL E/E' RATIO   6.00       LV SEPTAL E/E' RATIO   7.71       LV EDV BP   83.10       LV Diastolic Volume Index   45.66       Left Ventricular End Diastolic Volume by Teichholz Method   83.10       LV EDV A2C   [id number]       LV EDV A4C   103.00       Left Ventricular End Systolic Volume by Teichholz Method   47.40       LV ESV A4C   31.20       LVIDd   4.30       LVIDs   3.40       LV mass   142.49       LV Mass Index   78       Left Ventricular Outflow Tract Mean Gradient   2.00       Left Ventricular Outflow Tract Mean Velocity   0.54       LVOT diameter   2.00       LVOT peak herminio   0.85       LVOT stroke volume   48.04       LVOT peak VTI   15.30       LV ESV BP   47.40       LV Systolic Volume Index   26.0       Magnesium  2.30         MCH 34.7         MCHC 34.3         .3         Mean e'   0.08       MPV 9.8         MV valve area p 1/2 method   2.86       MV valve area by continuity eq   2.07       MV mean gradient   1       MV peak gradient   3       MV Peak A Herminio   0.71       MV Peak E Herminio   0.54       MV stenosis pressure 1/2 time   77.00       MV VTI   23.2       nRBC 0.0         Kelly's Biplane MOD Ejection Fraction   56       Platelet Count 199         Potassium 4.3         PROTEIN TOTAL 7.2         Posterior Wall   0.90       Est. RA pres   3       RBC 3.14         RDW 13.6         RV TB RVSP   6       Sinus   4.0       Sodium 138         TAPSE   1.74       TDI SEPTAL   0.07       TDI LATERAL   0.09       Triscuspid Valve Regurgitation Peak Gradient   27       TR Max Herminio   2.6       TV resting pulmonary artery pressure   30       WBC 5.98         ZLVIDD   -1.59       ZLVIDS   0.70               Significant Imaging: I have reviewed all pertinent imaging results/findings within the past 24 hours.

## 2024-08-23 ENCOUNTER — PATIENT OUTREACH (OUTPATIENT)
Dept: ADMINISTRATIVE | Facility: CLINIC | Age: 84
End: 2024-08-23
Payer: MEDICARE

## 2024-08-23 NOTE — PROGRESS NOTES
C3 nurse attempted to contact [facility]  for a T[facility] post hospital discharge follow up call. No answer. Left voicemail with callback information. The patient has a scheduled HOSFU appointment with Garcia Ye MD (Internal Medicine) on 9/10/2024 @4:45pm

## 2024-08-26 NOTE — PROGRESS NOTES
C3 nurse spoke with [facility] for a TCC post hospital discharge follow up call. The patient has a scheduled HOS appointment with Garcia Ye MD (Internal Medicine) on 9/10/2024 @4:45pm.

## 2025-04-28 DIAGNOSIS — R10.9 AP (ABDOMINAL PAIN): Primary | ICD-10-CM

## 2025-04-30 ENCOUNTER — HOSPITAL ENCOUNTER (OUTPATIENT)
Dept: RADIOLOGY | Facility: HOSPITAL | Age: 85
Discharge: HOME | End: 2025-04-30
Attending: INTERNAL MEDICINE
Payer: MEDICARE

## 2025-04-30 DIAGNOSIS — R05.1 ACUTE COUGH: ICD-10-CM

## 2025-04-30 DIAGNOSIS — R10.9 AP (ABDOMINAL PAIN): ICD-10-CM

## 2025-04-30 PROCEDURE — 76700 US EXAM ABDOM COMPLETE: CPT | Mod: TC

## 2025-04-30 PROCEDURE — 71046 X-RAY EXAM CHEST 2 VIEWS: CPT | Mod: TC

## (undated) DEVICE — TOWEL OR DISP STRL BLUE 4/PK

## (undated) DEVICE — TRAY SKIN SCRUB WET PREMIUM

## (undated) DEVICE — COVER TABLE 44X90 STERILE

## (undated) DEVICE — WIRE GUIDE 0.038OLD

## (undated) DEVICE — SOL IRRIGATION WATER 3000ML

## (undated) DEVICE — BOWL STERILE LARGE 32OZ

## (undated) DEVICE — GOWN POLY REINF BRTH SLV XL

## (undated) DEVICE — SUPPORT ULNA NERVE PROTECTOR

## (undated) DEVICE — SET CYSTO IRR DRP CHMBR 84IN

## (undated) DEVICE — CONTRAST ISOVUE 300 50ML

## (undated) DEVICE — DRAPE LEGGINGS CUFF 33X51IN

## (undated) DEVICE — EXTRACTOR TIPLESS 3FR 115 CM

## (undated) DEVICE — SOL IRRI STRL WATER 1000ML

## (undated) DEVICE — SPONGE DERMACEA 4X4IN 12PLY

## (undated) DEVICE — GLOVE PROTEXIS HYDROGEL SZ7.5

## (undated) DEVICE — GLOVE PROTEXIS LTX 6.5

## (undated) DEVICE — DRAPE T CYSTOSCOPY STERILE

## (undated) DEVICE — SYR 10CC LUER LOCK

## (undated) DEVICE — KNIFE DISPOSABLE COLD ANGLED

## (undated) DEVICE — GLOVE PROTEXIS BLUE LATEX 7

## (undated) DEVICE — CATH URTRL OPN END STR TP 6F